# Patient Record
Sex: FEMALE | Race: WHITE | NOT HISPANIC OR LATINO
[De-identification: names, ages, dates, MRNs, and addresses within clinical notes are randomized per-mention and may not be internally consistent; named-entity substitution may affect disease eponyms.]

---

## 2020-02-14 ENCOUNTER — TRANSCRIPTION ENCOUNTER (OUTPATIENT)
Age: 30
End: 2020-02-14

## 2020-07-24 ENCOUNTER — APPOINTMENT (OUTPATIENT)
Dept: ENDOCRINOLOGY | Facility: CLINIC | Age: 30
End: 2020-07-24
Payer: COMMERCIAL

## 2020-07-24 VITALS
HEIGHT: 61 IN | BODY MASS INDEX: 29.45 KG/M2 | OXYGEN SATURATION: 98 % | WEIGHT: 156 LBS | TEMPERATURE: 97.7 F | SYSTOLIC BLOOD PRESSURE: 120 MMHG | DIASTOLIC BLOOD PRESSURE: 68 MMHG | HEART RATE: 83 BPM

## 2020-07-24 DIAGNOSIS — Z78.9 OTHER SPECIFIED HEALTH STATUS: ICD-10-CM

## 2020-07-24 DIAGNOSIS — Z83.49 FAMILY HISTORY OF OTHER ENDOCRINE, NUTRITIONAL AND METABOLIC DISEASES: ICD-10-CM

## 2020-07-24 PROCEDURE — 99204 OFFICE O/P NEW MOD 45 MIN: CPT | Mod: 25

## 2020-07-24 PROCEDURE — 36415 COLL VENOUS BLD VENIPUNCTURE: CPT

## 2020-07-24 NOTE — HISTORY OF PRESENT ILLNESS
[FreeTextEntry1] : Ms. PETERSON  is a 30 year  year old female  who presents for initial endocrine evaluation. She presents with regard to a history of hypothyroidism dx about 5 yrs ago. Dose has incr from 50 to 100 mcg over past few years. Had thyroid US at [facility] several month sago-told normal . Additional medical history includes that of  psoriasis-uses steroid cream as needed.\par Mat Gm withh x of thyroid dysfxn and dad with psoriasis-mild.  Takes her thyroid fasting in am No known Hashimitos hx\par On ocp x 5-6 yrs -menses reg and light.\par Weight up somewhat over pst few yrs -now looking to eat better and exercise\par Works at [employer]\par Occasionally takes a probiotic.\par hair thinner overall over past few yrs-nails ok\par PMD  Dr. Victor Manuel Gutierrez

## 2020-07-24 NOTE — PHYSICAL EXAM
[Well Nourished] : well nourished [Alert] : alert [EOMI] : extra ocular movement intact [Well Developed] : well developed [No Acute Distress] : no acute distress [Normal Sclera/Conjunctiva] : normal sclera/conjunctiva [Normal Oropharynx] : the oropharynx was normal [No Proptosis] : no proptosis [Thyroid Not Enlarged] : the thyroid was not enlarged [No Thyroid Nodules] : no palpable thyroid nodules [No Accessory Muscle Use] : no accessory muscle use [No Respiratory Distress] : no respiratory distress [Normal Rate] : heart rate was normal [Clear to Auscultation] : lungs were clear to auscultation bilaterally [Normal S1, S2] : normal S1 and S2 [Regular Rhythm] : with a regular rhythm [Pedal Pulses Normal] : the pedal pulses are present [No Edema] : no peripheral edema [Not Tender] : non-tender [Not Distended] : not distended [Normal Bowel Sounds] : normal bowel sounds [Soft] : abdomen soft [No Spinal Tenderness] : no spinal tenderness [Normal Anterior Cervical Nodes] : no anterior cervical lymphadenopathy [Normal Posterior Cervical Nodes] : no posterior cervical lymphadenopathy [No Stigmata of Cushings Syndrome] : no stigmata of Cushings Syndrome [Normal Gait] : normal gait [Spine Straight] : spine straight [No Rash] : no rash [Normal Strength/Tone] : muscle strength and tone were normal [Acanthosis Nigricans] : no acanthosis nigricans [Normal Reflexes] : deep tendon reflexes were 2+ and symmetric [No Tremors] : no tremors [Oriented x3] : oriented to person, place, and time

## 2020-08-11 LAB
25(OH)D3 SERPL-MCNC: 29.7 NG/ML
ALBUMIN SERPL ELPH-MCNC: 4.5 G/DL
ALP BLD-CCNC: 86 U/L
ALT SERPL-CCNC: 39 U/L
ANION GAP SERPL CALC-SCNC: 15 MMOL/L
AST SERPL-CCNC: 33 U/L
BASOPHILS # BLD AUTO: 0.03 K/UL
BASOPHILS NFR BLD AUTO: 0.7 %
BILIRUB SERPL-MCNC: 0.4 MG/DL
BUN SERPL-MCNC: 7 MG/DL
CALCIUM SERPL-MCNC: 9.8 MG/DL
CHLORIDE SERPL-SCNC: 102 MMOL/L
CO2 SERPL-SCNC: 24 MMOL/L
CREAT SERPL-MCNC: 0.79 MG/DL
EOSINOPHIL # BLD AUTO: 0.03 K/UL
EOSINOPHIL NFR BLD AUTO: 0.7 %
GLUCOSE SERPL-MCNC: 94 MG/DL
HCT VFR BLD CALC: 41.8 %
HGB BLD-MCNC: 13.6 G/DL
IMM GRANULOCYTES NFR BLD AUTO: 0.2 %
LYMPHOCYTES # BLD AUTO: 1.59 K/UL
LYMPHOCYTES NFR BLD AUTO: 39.2 %
MAN DIFF?: NORMAL
MCHC RBC-ENTMCNC: 29.8 PG
MCHC RBC-ENTMCNC: 32.5 GM/DL
MCV RBC AUTO: 91.5 FL
MONOCYTES # BLD AUTO: 0.31 K/UL
MONOCYTES NFR BLD AUTO: 7.6 %
NEUTROPHILS # BLD AUTO: 2.09 K/UL
NEUTROPHILS NFR BLD AUTO: 51.6 %
PLATELET # BLD AUTO: 180 K/UL
POTASSIUM SERPL-SCNC: 4.9 MMOL/L
PROT SERPL-MCNC: 6.4 G/DL
RBC # BLD: 4.57 M/UL
RBC # FLD: 11.9 %
SARS-COV-2 IGG SERPL IA-ACNC: 0.01 INDEX
SARS-COV-2 IGG SERPL QL IA: NEGATIVE
SODIUM SERPL-SCNC: 141 MMOL/L
T3FREE SERPL-MCNC: 3.41 PG/ML
T4 FREE SERPL-MCNC: 1.8 NG/DL
THYROGLOB AB SERPL-ACNC: 47 IU/ML
THYROPEROXIDASE AB SERPL IA-ACNC: 213 IU/ML
TSH SERPL-ACNC: 4.21 UIU/ML
WBC # FLD AUTO: 4.06 K/UL

## 2020-10-14 ENCOUNTER — RX RENEWAL (OUTPATIENT)
Age: 30
End: 2020-10-14

## 2020-11-09 ENCOUNTER — NON-APPOINTMENT (OUTPATIENT)
Age: 30
End: 2020-11-09

## 2020-12-14 ENCOUNTER — APPOINTMENT (OUTPATIENT)
Dept: ENDOCRINOLOGY | Facility: CLINIC | Age: 30
End: 2020-12-14
Payer: COMMERCIAL

## 2020-12-14 VITALS
TEMPERATURE: 98.6 F | HEART RATE: 86 BPM | SYSTOLIC BLOOD PRESSURE: 120 MMHG | DIASTOLIC BLOOD PRESSURE: 72 MMHG | WEIGHT: 157 LBS | OXYGEN SATURATION: 99 % | HEIGHT: 61 IN | BODY MASS INDEX: 29.64 KG/M2 | RESPIRATION RATE: 16 BRPM

## 2020-12-14 PROCEDURE — 99214 OFFICE O/P EST MOD 30 MIN: CPT | Mod: 25

## 2020-12-14 PROCEDURE — 99072 ADDL SUPL MATRL&STAF TM PHE: CPT

## 2020-12-14 PROCEDURE — 36415 COLL VENOUS BLD VENIPUNCTURE: CPT

## 2020-12-14 NOTE — HISTORY OF PRESENT ILLNESS
[FreeTextEntry1] : Ms. ANTON RONDON   is a 30 year  year old  female who returns today in follow up with regard to a history of hypothyroidism.  She  is currently taking Levothyroxine 112 mcg daily. She  has been compliant in taking the LT4 daily, away from food or any medication that may inhibit absorption. She  has tolerated this medication well. Without any apparent adverse effects.  She denies any temperature intolerance, significant weight changes, or severe fatigue. She  in addition denies any palpitations, tremors, anxiousness, change in bowel habits or significant change in moods. .\par Additional medical history includes vit D deficiency; taking vit D 50,000 iu weekly\par Vit d was started at lst visit and did come up from 29-56 and Tsh did come down from 4.2 to 2.1\par + Underlying Hashimiatos\par \par

## 2020-12-16 LAB
25(OH)D3 SERPL-MCNC: 54.8 NG/ML
T3FREE SERPL-MCNC: 2.73 PG/ML
T4 FREE SERPL-MCNC: 1.7 NG/DL
TSH SERPL-ACNC: 1.53 UIU/ML

## 2020-12-19 LAB
25(OH)D3 SERPL-MCNC: 56.4 NG/ML
T3FREE SERPL-MCNC: 2.76 PG/ML
T4 FREE SERPL-MCNC: 1.8 NG/DL
TSH SERPL-ACNC: 2.1 UIU/ML

## 2021-06-03 ENCOUNTER — APPOINTMENT (OUTPATIENT)
Dept: ENDOCRINOLOGY | Facility: CLINIC | Age: 31
End: 2021-06-03
Payer: COMMERCIAL

## 2021-06-03 VITALS
SYSTOLIC BLOOD PRESSURE: 122 MMHG | WEIGHT: 155 LBS | BODY MASS INDEX: 29.27 KG/M2 | HEIGHT: 61 IN | OXYGEN SATURATION: 98 % | DIASTOLIC BLOOD PRESSURE: 78 MMHG | HEART RATE: 85 BPM | TEMPERATURE: 98.2 F

## 2021-06-03 PROCEDURE — 99214 OFFICE O/P EST MOD 30 MIN: CPT | Mod: 25

## 2021-06-03 PROCEDURE — 36415 COLL VENOUS BLD VENIPUNCTURE: CPT

## 2021-06-03 PROCEDURE — 99072 ADDL SUPL MATRL&STAF TM PHE: CPT

## 2021-06-03 NOTE — PHYSICAL EXAM
[Alert] : alert [Well Nourished] : well nourished [Well Developed] : well developed [No Acute Distress] : no acute distress [Normal Sclera/Conjunctiva] : normal sclera/conjunctiva [EOMI] : extra ocular movement intact [No Proptosis] : no proptosis [Normal Oropharynx] : the oropharynx was normal [Thyroid Not Enlarged] : the thyroid was not enlarged [No Thyroid Nodules] : no palpable thyroid nodules [No Respiratory Distress] : no respiratory distress [No Accessory Muscle Use] : no accessory muscle use [Clear to Auscultation] : lungs were clear to auscultation bilaterally [Normal S1, S2] : normal S1 and S2 [Normal Rate] : heart rate was normal [No Edema] : no peripheral edema [Regular Rhythm] : with a regular rhythm [Pedal Pulses Normal] : the pedal pulses are present [Normal Bowel Sounds] : normal bowel sounds [Not Tender] : non-tender [Not Distended] : not distended [Soft] : abdomen soft [Normal Anterior Cervical Nodes] : no anterior cervical lymphadenopathy [Normal Posterior Cervical Nodes] : no posterior cervical lymphadenopathy [No Spinal Tenderness] : no spinal tenderness [Spine Straight] : spine straight [No Stigmata of Cushings Syndrome] : no stigmata of Cushings Syndrome [Normal Gait] : normal gait [Normal Strength/Tone] : muscle strength and tone were normal [No Rash] : no rash [Normal Reflexes] : deep tendon reflexes were 2+ and symmetric [No Tremors] : no tremors [Oriented x3] : oriented to person, place, and time [Acanthosis Nigricans] : no acanthosis nigricans

## 2021-06-03 NOTE — HISTORY OF PRESENT ILLNESS
[FreeTextEntry1] : Ms. ANTON RONDON is a 31 year old female who returns today in follow up with regard to a history of hypothyroidism. She is currently taking Levothyroxine 112 mcg daily. She has been compliant in taking the LT4 daily, away from food or any medication that may inhibit absorption. She has tolerated this medication well. Without any apparent adverse effects. She denies any temperature intolerance, significant weight changes, or severe fatigue. She in addition denies any palpitations, tremors, anxiousness, change in bowel habits or significant change in moods. She had an incident where she had floaters in her eyes, followed with her PMD who suggested she meet with a neurologist.Has happened in past, but has not happened again after resolving both times. \par Additional medical history includes vit D deficiency; taking vit D 50,000 iu weekly. She is also taking OCP. \par Vit d was started at lst visit and did come up from 29-56 and Tsh did come down from 4.2 to 2.1\par + Underlying Hashimiotos\par Had both doses of covid vaccine

## 2021-06-21 RX ORDER — ERGOCALCIFEROL 1.25 MG/1
1.25 MG CAPSULE, LIQUID FILLED ORAL
Qty: 4 | Refills: 2 | Status: DISCONTINUED | COMMUNITY
Start: 2020-08-11 | End: 2021-06-21

## 2021-06-22 LAB
25(OH)D3 SERPL-MCNC: 77.3 NG/ML
ALBUMIN SERPL ELPH-MCNC: 4.4 G/DL
ALP BLD-CCNC: 69 U/L
ALT SERPL-CCNC: 18 U/L
ANION GAP SERPL CALC-SCNC: 14 MMOL/L
AST SERPL-CCNC: 20 U/L
BASOPHILS # BLD AUTO: 0.08 K/UL
BASOPHILS NFR BLD AUTO: 1 %
BILIRUB SERPL-MCNC: 0.2 MG/DL
BUN SERPL-MCNC: 10 MG/DL
CALCIUM SERPL-MCNC: 9.9 MG/DL
CHLORIDE SERPL-SCNC: 103 MMOL/L
CO2 SERPL-SCNC: 22 MMOL/L
CREAT SERPL-MCNC: 0.73 MG/DL
EOSINOPHIL # BLD AUTO: 0.23 K/UL
EOSINOPHIL NFR BLD AUTO: 3 %
FERRITIN SERPL-MCNC: 59 NG/ML
GLUCOSE SERPL-MCNC: 75 MG/DL
HCT VFR BLD CALC: 46.3 %
HGB BLD-MCNC: 15 G/DL
IMM GRANULOCYTES NFR BLD AUTO: 0.1 %
IRON SERPL-MCNC: 63 UG/DL
LYMPHOCYTES # BLD AUTO: 2.47 K/UL
LYMPHOCYTES NFR BLD AUTO: 32.4 %
MAN DIFF?: NORMAL
MCHC RBC-ENTMCNC: 30.4 PG
MCHC RBC-ENTMCNC: 32.4 GM/DL
MCV RBC AUTO: 93.7 FL
MONOCYTES # BLD AUTO: 0.53 K/UL
MONOCYTES NFR BLD AUTO: 7 %
NEUTROPHILS # BLD AUTO: 4.3 K/UL
NEUTROPHILS NFR BLD AUTO: 56.5 %
PLATELET # BLD AUTO: 303 K/UL
POTASSIUM SERPL-SCNC: 4.4 MMOL/L
PROT SERPL-MCNC: 7.2 G/DL
RBC # BLD: 4.94 M/UL
RBC # FLD: 12.4 %
SODIUM SERPL-SCNC: 138 MMOL/L
T3FREE SERPL-MCNC: 2.82 PG/ML
T4 FREE SERPL-MCNC: 1.7 NG/DL
TSH SERPL-ACNC: 2.49 UIU/ML
VIT B12 SERPL-MCNC: 334 PG/ML
WBC # FLD AUTO: 7.62 K/UL

## 2021-09-22 ENCOUNTER — APPOINTMENT (OUTPATIENT)
Dept: ENDOCRINOLOGY | Facility: CLINIC | Age: 31
End: 2021-09-22
Payer: COMMERCIAL

## 2021-09-22 VITALS
DIASTOLIC BLOOD PRESSURE: 78 MMHG | RESPIRATION RATE: 16 BRPM | BODY MASS INDEX: 29.85 KG/M2 | OXYGEN SATURATION: 98 % | TEMPERATURE: 98.6 F | HEART RATE: 74 BPM | SYSTOLIC BLOOD PRESSURE: 122 MMHG | WEIGHT: 158 LBS

## 2021-09-22 PROCEDURE — 36415 COLL VENOUS BLD VENIPUNCTURE: CPT

## 2021-09-22 PROCEDURE — 99214 OFFICE O/P EST MOD 30 MIN: CPT | Mod: 25

## 2021-09-22 NOTE — HISTORY OF PRESENT ILLNESS
[FreeTextEntry1] : Ms. ANTON RONDON is a 31 year old female who returns today in follow up with regard to a history of hypothyroidism. She is currently taking Levothyroxine 112 mcg daily in the am. She has been compliant in taking the LT4 daily, away from food or any medication that may inhibit absorption. She has tolerated this medication well. Without any apparent adverse effects. She denies any temperature intolerance, significant weight changes, or severe fatigue. She in addition denies any palpitations, tremors, anxiousness, change in bowel habits or significant change in moods. She had an incident where she had floaters in her eyes, followed with her PMD who suggested she meet with a neurologist.Has happened in past, but has not happened again after resolving both times. \par Additional medical history includes vit D deficiency; taking vit D 1,000 daily. She is also taking OCP. \par TSH returned at 2.49 on 06/03/2021.\par Vit d was started at lst visit and did come up from 29-56.\par + Underlying Hashimiotos\par Had both doses of covid vaccine \par

## 2021-10-06 ENCOUNTER — NON-APPOINTMENT (OUTPATIENT)
Age: 31
End: 2021-10-06

## 2021-10-06 LAB
25(OH)D3 SERPL-MCNC: 50.4 NG/ML
T3FREE SERPL-MCNC: 2.51 PG/ML
T4 FREE SERPL-MCNC: 1.7 NG/DL
THYROGLOB AB SERPL-ACNC: 25.5 IU/ML
THYROPEROXIDASE AB SERPL IA-ACNC: 114 IU/ML
TSH SERPL-ACNC: 2.7 UIU/ML
VIT B12 SERPL-MCNC: 321 PG/ML

## 2021-10-18 ENCOUNTER — TRANSCRIPTION ENCOUNTER (OUTPATIENT)
Age: 31
End: 2021-10-18

## 2021-10-25 ENCOUNTER — TRANSCRIPTION ENCOUNTER (OUTPATIENT)
Age: 31
End: 2021-10-25

## 2022-03-07 ENCOUNTER — APPOINTMENT (OUTPATIENT)
Dept: ENDOCRINOLOGY | Facility: CLINIC | Age: 32
End: 2022-03-07
Payer: COMMERCIAL

## 2022-03-07 VITALS
DIASTOLIC BLOOD PRESSURE: 74 MMHG | WEIGHT: 165 LBS | TEMPERATURE: 98.6 F | BODY MASS INDEX: 31.18 KG/M2 | RESPIRATION RATE: 15 BRPM | HEART RATE: 78 BPM | SYSTOLIC BLOOD PRESSURE: 120 MMHG | OXYGEN SATURATION: 99 %

## 2022-03-07 DIAGNOSIS — L40.9 PSORIASIS, UNSPECIFIED: ICD-10-CM

## 2022-03-07 PROCEDURE — 99214 OFFICE O/P EST MOD 30 MIN: CPT | Mod: 25

## 2022-03-07 PROCEDURE — 36415 COLL VENOUS BLD VENIPUNCTURE: CPT

## 2022-03-07 NOTE — HISTORY OF PRESENT ILLNESS
[FreeTextEntry1] : Ms. ANTON RONDON   is a 31 year old  female who returns today in follow up with regard to a history of hypothyroidism.  She  is currently taking Levothyroxine 112 mcg daily. She  has been compliant in taking the LT4 daily, away from food or any medication that may inhibit absorption. She  has tolerated this medication well. Without any apparent adverse effects.  She denies any temperature intolerance, significant weight changes, or severe fatigue. She  in addition denies any palpitations, tremors, anxiousness, change in bowel habits or significant change in moods. \par Patient is looking to conceive. Has been trying to conceive since October. States she was on birth control for about 7 years. Meeting with fertility specialist at the end of the month. GYN [facility] in [place]. \par Additional medical history includes vit D deficiency; taking vit D3 1,000 IU daily, prenatal vitamin that includes D3 1,000 IU, probiotic, and Brazil nut.\par Too with psoriasis- on ketoconazole. \par Vit d was started at lst visit and did come up from 29-56 and Tsh did come down from 4.2 to 2.1\par + Underlying Hashimoto's\par \par Denies covid infection

## 2022-03-08 LAB
25(OH)D3 SERPL-MCNC: 38.4 NG/ML
T3FREE SERPL-MCNC: 2.72 PG/ML
T4 FREE SERPL-MCNC: 1.8 NG/DL
THYROGLOB AB SERPL-ACNC: <20 IU/ML
THYROPEROXIDASE AB SERPL IA-ACNC: 73.5 IU/ML
TSH SERPL-ACNC: 1.92 UIU/ML

## 2022-03-23 ENCOUNTER — APPOINTMENT (OUTPATIENT)
Dept: HUMAN REPRODUCTION | Facility: CLINIC | Age: 32
End: 2022-03-23
Payer: COMMERCIAL

## 2022-03-23 PROCEDURE — 99204 OFFICE O/P NEW MOD 45 MIN: CPT | Mod: 95

## 2022-04-12 ENCOUNTER — APPOINTMENT (OUTPATIENT)
Dept: HUMAN REPRODUCTION | Facility: CLINIC | Age: 32
End: 2022-04-12
Payer: COMMERCIAL

## 2022-04-12 PROCEDURE — 36415 COLL VENOUS BLD VENIPUNCTURE: CPT

## 2022-04-12 PROCEDURE — 99213 OFFICE O/P EST LOW 20 MIN: CPT | Mod: 25

## 2022-04-12 PROCEDURE — 76830 TRANSVAGINAL US NON-OB: CPT

## 2022-04-14 ENCOUNTER — NON-APPOINTMENT (OUTPATIENT)
Age: 32
End: 2022-04-14

## 2022-04-19 ENCOUNTER — APPOINTMENT (OUTPATIENT)
Dept: HUMAN REPRODUCTION | Facility: CLINIC | Age: 32
End: 2022-04-19
Payer: COMMERCIAL

## 2022-04-19 PROCEDURE — 36415 COLL VENOUS BLD VENIPUNCTURE: CPT

## 2022-04-19 PROCEDURE — 99211 OFF/OP EST MAY X REQ PHY/QHP: CPT | Mod: 25

## 2022-04-19 PROCEDURE — 76830 TRANSVAGINAL US NON-OB: CPT

## 2022-04-26 ENCOUNTER — APPOINTMENT (OUTPATIENT)
Dept: HUMAN REPRODUCTION | Facility: CLINIC | Age: 32
End: 2022-04-26
Payer: COMMERCIAL

## 2022-04-26 PROCEDURE — 36415 COLL VENOUS BLD VENIPUNCTURE: CPT

## 2022-04-26 PROCEDURE — 76830 TRANSVAGINAL US NON-OB: CPT

## 2022-04-26 PROCEDURE — 99213 OFFICE O/P EST LOW 20 MIN: CPT | Mod: 25

## 2022-04-29 ENCOUNTER — APPOINTMENT (OUTPATIENT)
Dept: HUMAN REPRODUCTION | Facility: CLINIC | Age: 32
End: 2022-04-29
Payer: COMMERCIAL

## 2022-04-29 PROCEDURE — 99213 OFFICE O/P EST LOW 20 MIN: CPT | Mod: 25

## 2022-04-29 PROCEDURE — 36415 COLL VENOUS BLD VENIPUNCTURE: CPT

## 2022-04-29 PROCEDURE — 76830 TRANSVAGINAL US NON-OB: CPT

## 2022-05-18 ENCOUNTER — APPOINTMENT (OUTPATIENT)
Dept: HUMAN REPRODUCTION | Facility: CLINIC | Age: 32
End: 2022-05-18
Payer: COMMERCIAL

## 2022-05-18 PROCEDURE — 99213 OFFICE O/P EST LOW 20 MIN: CPT | Mod: 25

## 2022-05-18 PROCEDURE — 36415 COLL VENOUS BLD VENIPUNCTURE: CPT

## 2022-05-18 PROCEDURE — 76830 TRANSVAGINAL US NON-OB: CPT

## 2022-05-23 LAB
T3FREE SERPL-MCNC: 3.07 PG/ML
T4 FREE SERPL-MCNC: 2.1 NG/DL
TSH SERPL-ACNC: 0.4 UIU/ML

## 2022-05-26 ENCOUNTER — APPOINTMENT (OUTPATIENT)
Dept: HUMAN REPRODUCTION | Facility: CLINIC | Age: 32
End: 2022-05-26
Payer: COMMERCIAL

## 2022-05-26 PROCEDURE — 99213 OFFICE O/P EST LOW 20 MIN: CPT | Mod: 25

## 2022-05-26 PROCEDURE — 76830 TRANSVAGINAL US NON-OB: CPT

## 2022-06-14 ENCOUNTER — NON-APPOINTMENT (OUTPATIENT)
Age: 32
End: 2022-06-14

## 2022-06-16 ENCOUNTER — APPOINTMENT (OUTPATIENT)
Dept: HUMAN REPRODUCTION | Facility: CLINIC | Age: 32
End: 2022-06-16
Payer: COMMERCIAL

## 2022-06-16 PROCEDURE — 36415 COLL VENOUS BLD VENIPUNCTURE: CPT

## 2022-06-16 PROCEDURE — 99211 OFF/OP EST MAY X REQ PHY/QHP: CPT | Mod: 25

## 2022-06-18 ENCOUNTER — APPOINTMENT (OUTPATIENT)
Dept: HUMAN REPRODUCTION | Facility: CLINIC | Age: 32
End: 2022-06-18
Payer: COMMERCIAL

## 2022-06-18 PROCEDURE — 99211 OFF/OP EST MAY X REQ PHY/QHP: CPT | Mod: 25

## 2022-06-18 PROCEDURE — 36415 COLL VENOUS BLD VENIPUNCTURE: CPT

## 2022-06-21 ENCOUNTER — NON-APPOINTMENT (OUTPATIENT)
Age: 32
End: 2022-06-21

## 2022-06-22 ENCOUNTER — APPOINTMENT (OUTPATIENT)
Dept: HUMAN REPRODUCTION | Facility: CLINIC | Age: 32
End: 2022-06-22
Payer: COMMERCIAL

## 2022-06-22 PROCEDURE — 99213 OFFICE O/P EST LOW 20 MIN: CPT | Mod: 25

## 2022-06-22 PROCEDURE — 76830 TRANSVAGINAL US NON-OB: CPT

## 2022-06-22 PROCEDURE — 36415 COLL VENOUS BLD VENIPUNCTURE: CPT

## 2022-06-30 ENCOUNTER — APPOINTMENT (OUTPATIENT)
Dept: HUMAN REPRODUCTION | Facility: CLINIC | Age: 32
End: 2022-06-30

## 2022-06-30 PROCEDURE — 99213 OFFICE O/P EST LOW 20 MIN: CPT | Mod: 25

## 2022-06-30 PROCEDURE — 76830 TRANSVAGINAL US NON-OB: CPT

## 2022-07-06 ENCOUNTER — APPOINTMENT (OUTPATIENT)
Dept: HUMAN REPRODUCTION | Facility: CLINIC | Age: 32
End: 2022-07-06

## 2022-07-06 PROCEDURE — 76830 TRANSVAGINAL US NON-OB: CPT

## 2022-07-06 PROCEDURE — 36415 COLL VENOUS BLD VENIPUNCTURE: CPT

## 2022-07-06 PROCEDURE — 99213 OFFICE O/P EST LOW 20 MIN: CPT | Mod: 25

## 2022-07-11 ENCOUNTER — APPOINTMENT (OUTPATIENT)
Dept: HUMAN REPRODUCTION | Facility: CLINIC | Age: 32
End: 2022-07-11

## 2022-07-11 PROCEDURE — 99213 OFFICE O/P EST LOW 20 MIN: CPT | Mod: 25

## 2022-07-11 PROCEDURE — 36415 COLL VENOUS BLD VENIPUNCTURE: CPT

## 2022-07-11 PROCEDURE — 76830 TRANSVAGINAL US NON-OB: CPT

## 2022-07-18 LAB
T3FREE SERPL-MCNC: 3.42 PG/ML
T4 FREE SERPL-MCNC: 2.4 NG/DL
TSH SERPL-ACNC: 0.3 UIU/ML

## 2022-07-28 ENCOUNTER — APPOINTMENT (OUTPATIENT)
Dept: HUMAN REPRODUCTION | Facility: CLINIC | Age: 32
End: 2022-07-28

## 2022-07-28 PROCEDURE — 36415 COLL VENOUS BLD VENIPUNCTURE: CPT

## 2022-07-28 PROCEDURE — 76830 TRANSVAGINAL US NON-OB: CPT

## 2022-07-28 PROCEDURE — 99213 OFFICE O/P EST LOW 20 MIN: CPT | Mod: 25

## 2022-08-04 ENCOUNTER — APPOINTMENT (OUTPATIENT)
Dept: HUMAN REPRODUCTION | Facility: CLINIC | Age: 32
End: 2022-08-04

## 2022-08-04 PROCEDURE — 76830 TRANSVAGINAL US NON-OB: CPT

## 2022-08-04 PROCEDURE — 99213 OFFICE O/P EST LOW 20 MIN: CPT | Mod: 25

## 2022-08-08 ENCOUNTER — APPOINTMENT (OUTPATIENT)
Dept: HUMAN REPRODUCTION | Facility: CLINIC | Age: 32
End: 2022-08-08

## 2022-08-16 LAB
T3FREE SERPL-MCNC: 2.72 PG/ML
T4 FREE SERPL-MCNC: 1.6 NG/DL
TSH SERPL-ACNC: 0.51 UIU/ML

## 2022-08-16 RX ORDER — LEVOTHYROXINE SODIUM 0.11 MG/1
112 TABLET ORAL DAILY
Qty: 90 | Refills: 1 | Status: DISCONTINUED | COMMUNITY
Start: 2020-08-11 | End: 2022-08-16

## 2022-08-16 RX ORDER — LEVOTHYROXINE SODIUM 0.14 MG/1
137 TABLET ORAL
Qty: 60 | Refills: 0 | Status: DISCONTINUED | COMMUNITY
Start: 2022-04-18 | End: 2022-08-16

## 2022-08-30 ENCOUNTER — APPOINTMENT (OUTPATIENT)
Dept: HUMAN REPRODUCTION | Facility: CLINIC | Age: 32
End: 2022-08-30

## 2022-08-30 PROCEDURE — 36415 COLL VENOUS BLD VENIPUNCTURE: CPT

## 2022-08-30 PROCEDURE — 99211 OFF/OP EST MAY X REQ PHY/QHP: CPT | Mod: 25

## 2022-09-01 ENCOUNTER — APPOINTMENT (OUTPATIENT)
Dept: HUMAN REPRODUCTION | Facility: CLINIC | Age: 32
End: 2022-09-01

## 2022-09-01 PROCEDURE — 99211 OFF/OP EST MAY X REQ PHY/QHP: CPT | Mod: 25

## 2022-09-01 PROCEDURE — 36415 COLL VENOUS BLD VENIPUNCTURE: CPT

## 2022-09-08 ENCOUNTER — APPOINTMENT (OUTPATIENT)
Dept: HUMAN REPRODUCTION | Facility: CLINIC | Age: 32
End: 2022-09-08

## 2022-09-08 PROCEDURE — 76817 TRANSVAGINAL US OBSTETRIC: CPT

## 2022-09-08 PROCEDURE — 99213 OFFICE O/P EST LOW 20 MIN: CPT | Mod: 25

## 2022-09-19 ENCOUNTER — APPOINTMENT (OUTPATIENT)
Dept: HUMAN REPRODUCTION | Facility: CLINIC | Age: 32
End: 2022-09-19

## 2022-09-19 PROCEDURE — 99213 OFFICE O/P EST LOW 20 MIN: CPT | Mod: 25

## 2022-09-19 PROCEDURE — 76817 TRANSVAGINAL US OBSTETRIC: CPT

## 2022-09-29 ENCOUNTER — RESULT REVIEW (OUTPATIENT)
Age: 32
End: 2022-09-29

## 2022-09-30 LAB
25(OH)D3 SERPL-MCNC: 64.3 NG/ML
T3FREE SERPL-MCNC: 2.21 PG/ML
T4 FREE SERPL-MCNC: 1.6 NG/DL
TSH SERPL-ACNC: 0.74 UIU/ML

## 2022-10-31 ENCOUNTER — APPOINTMENT (OUTPATIENT)
Dept: ENDOCRINOLOGY | Facility: CLINIC | Age: 32
End: 2022-10-31

## 2022-10-31 VITALS
BODY MASS INDEX: 32.1 KG/M2 | WEIGHT: 170 LBS | SYSTOLIC BLOOD PRESSURE: 118 MMHG | HEIGHT: 61 IN | HEART RATE: 100 BPM | TEMPERATURE: 98.4 F | OXYGEN SATURATION: 99 % | DIASTOLIC BLOOD PRESSURE: 78 MMHG

## 2022-10-31 PROCEDURE — 36415 COLL VENOUS BLD VENIPUNCTURE: CPT

## 2022-10-31 PROCEDURE — 99214 OFFICE O/P EST MOD 30 MIN: CPT | Mod: 25

## 2022-11-02 ENCOUNTER — NON-APPOINTMENT (OUTPATIENT)
Age: 32
End: 2022-11-02

## 2022-11-02 LAB
25(OH)D3 SERPL-MCNC: 58.1 NG/ML
FOLATE SERPL-MCNC: >20 NG/ML
T3FREE SERPL-MCNC: 2.88 PG/ML
T4 FREE SERPL-MCNC: 1.5 NG/DL
THYROGLOB AB SERPL-ACNC: <20 IU/ML
THYROPEROXIDASE AB SERPL IA-ACNC: 64.6 IU/ML
TSH SERPL-ACNC: 2.64 UIU/ML
VIT B12 SERPL-MCNC: 464 PG/ML

## 2022-11-03 NOTE — HISTORY OF PRESENT ILLNESS
[FreeTextEntry1] : Ms. ANTON RONDON is a 32 year old female who returns today in follow up with regard to a history of hypothyroidism. She is currently taking Levothyroxine 137 mcg 5 days of the week and 125 mcg two days of the week. She is currently 13 weeks and 4 days pregnant, due date 5/4/22 with sex of baby as a surprise.  Patients nausea and appetite has improved significantly in second trimester. Energy levels also improving.  \par \par She has been compliant in taking the LT4 daily, away from food or any medication that may inhibit absorption. She has tolerated this medication well. Without any apparent adverse effects. She denies any temperature intolerance, significant weight changes, or severe fatigue. She in addition denies any palpitations, tremors, anxiousness, change in bowel habits or significant change in moods. 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 . \par + Underlying Hashimoto's\par \par OBGYN: Dr. Warren\par Denies covid infection \par

## 2022-12-02 LAB
T3FREE SERPL-MCNC: 2.79 PG/ML
T4 FREE SERPL-MCNC: 1.4 NG/DL
TSH SERPL-ACNC: 1.73 UIU/ML

## 2022-12-16 ENCOUNTER — ASOB RESULT (OUTPATIENT)
Age: 32
End: 2022-12-16

## 2022-12-16 ENCOUNTER — APPOINTMENT (OUTPATIENT)
Dept: ANTEPARTUM | Facility: CLINIC | Age: 32
End: 2022-12-16

## 2022-12-16 PROCEDURE — 76811 OB US DETAILED SNGL FETUS: CPT

## 2022-12-30 LAB
T3FREE SERPL-MCNC: 2.66 PG/ML
T4 FREE SERPL-MCNC: 1.2 NG/DL
TSH SERPL-ACNC: 0.56 UIU/ML

## 2023-02-06 ENCOUNTER — APPOINTMENT (OUTPATIENT)
Dept: ENDOCRINOLOGY | Facility: CLINIC | Age: 33
End: 2023-02-06
Payer: COMMERCIAL

## 2023-02-06 VITALS
HEIGHT: 61 IN | OXYGEN SATURATION: 98 % | TEMPERATURE: 98.2 F | WEIGHT: 184 LBS | DIASTOLIC BLOOD PRESSURE: 80 MMHG | HEART RATE: 99 BPM | SYSTOLIC BLOOD PRESSURE: 120 MMHG | BODY MASS INDEX: 34.74 KG/M2

## 2023-02-06 DIAGNOSIS — Z00.00 ENCOUNTER FOR GENERAL ADULT MEDICAL EXAMINATION W/OUT ABNORMAL FINDINGS: ICD-10-CM

## 2023-02-06 PROCEDURE — 99214 OFFICE O/P EST MOD 30 MIN: CPT | Mod: 25

## 2023-02-06 PROCEDURE — 36415 COLL VENOUS BLD VENIPUNCTURE: CPT

## 2023-02-06 RX ORDER — LEVOTHYROXINE SODIUM 0.12 MG/1
125 TABLET ORAL
Qty: 10 | Refills: 0 | Status: DISCONTINUED | COMMUNITY
Start: 2022-05-23 | End: 2023-02-06

## 2023-02-07 LAB
T3FREE SERPL-MCNC: 2.65 PG/ML
T4 FREE SERPL-MCNC: 1.3 NG/DL
THYROGLOB AB SERPL-ACNC: <20 IU/ML
THYROPEROXIDASE AB SERPL IA-ACNC: 19.7 IU/ML
TSH SERPL-ACNC: 0.8 UIU/ML

## 2023-02-11 NOTE — HISTORY OF PRESENT ILLNESS
[FreeTextEntry1] : Ms. ANTON RONDON is a 32 year old female who returns today in follow up with regard to a history of hypothyroidism. She is currently taking Levothyroxine 137 mcg daily. her due date 5/4/22.\par Patients nausea and appetite has improved significantly in second trimester. Energy levels also improving. \par \par She is currently 27 weeks gestation.\par \par She has been compliant in taking the LT4 daily, away from food or any medication that may inhibit absorption. She has tolerated this medication well. Without any apparent adverse effects. She denies any temperature intolerance, significant weight changes, or severe fatigue. She in addition denies any palpitations, tremors, anxiousness, change in bowel habits or significant change in moods. 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 . \par + Underlying Hashimoto's\par \par OBGYN: Dr. Warren\par Denies covid infection

## 2023-03-17 LAB
T3FREE SERPL-MCNC: 2.63 PG/ML
T4 FREE SERPL-MCNC: 1.4 NG/DL
TSH SERPL-ACNC: 0.64 UIU/ML

## 2023-03-24 ENCOUNTER — ASOB RESULT (OUTPATIENT)
Age: 33
End: 2023-03-24

## 2023-03-24 ENCOUNTER — APPOINTMENT (OUTPATIENT)
Dept: MATERNAL FETAL MEDICINE | Facility: CLINIC | Age: 33
End: 2023-03-24
Payer: COMMERCIAL

## 2023-03-24 PROCEDURE — G0109 DIAB MANAGE TRN IND/GROUP: CPT | Mod: 95

## 2023-03-24 RX ORDER — CHOLECALCIFEROL (VITAMIN D3) 10(400)/ML
DROPS ORAL
Qty: 2 | Refills: 0 | Status: ACTIVE | COMMUNITY
Start: 2023-03-24 | End: 1900-01-01

## 2023-03-24 RX ORDER — BLOOD-GLUCOSE METER
W/DEVICE KIT MISCELLANEOUS
Qty: 1 | Refills: 0 | Status: ACTIVE | COMMUNITY
Start: 2023-03-24 | End: 1900-01-01

## 2023-03-24 RX ORDER — BLOOD-GLUCOSE METER
KIT MISCELLANEOUS
Qty: 2 | Refills: 1 | Status: ACTIVE | COMMUNITY
Start: 2023-03-24 | End: 1900-01-01

## 2023-03-24 RX ORDER — URINE ACETONE TEST STRIPS
STRIP MISCELLANEOUS
Qty: 1 | Refills: 0 | Status: ACTIVE | COMMUNITY
Start: 2023-03-24 | End: 1900-01-01

## 2023-04-04 ENCOUNTER — ASOB RESULT (OUTPATIENT)
Age: 33
End: 2023-04-04

## 2023-04-04 ENCOUNTER — APPOINTMENT (OUTPATIENT)
Dept: MATERNAL FETAL MEDICINE | Facility: CLINIC | Age: 33
End: 2023-04-04
Payer: COMMERCIAL

## 2023-04-04 PROCEDURE — G0108 DIAB MANAGE TRN  PER INDIV: CPT | Mod: 95

## 2023-04-14 ENCOUNTER — ASOB RESULT (OUTPATIENT)
Age: 33
End: 2023-04-14

## 2023-04-14 ENCOUNTER — APPOINTMENT (OUTPATIENT)
Dept: MATERNAL FETAL MEDICINE | Facility: CLINIC | Age: 33
End: 2023-04-14
Payer: COMMERCIAL

## 2023-04-14 PROCEDURE — G0108 DIAB MANAGE TRN  PER INDIV: CPT | Mod: 95

## 2023-04-17 LAB
T3FREE SERPL-MCNC: 2.68 PG/ML
T4 FREE SERPL-MCNC: 1.1 NG/DL
TSH SERPL-ACNC: 1.55 UIU/ML

## 2023-05-03 ENCOUNTER — INPATIENT (INPATIENT)
Facility: HOSPITAL | Age: 33
LOS: 3 days | Discharge: HOME | End: 2023-05-07
Attending: OBSTETRICS & GYNECOLOGY | Admitting: OBSTETRICS & GYNECOLOGY
Payer: COMMERCIAL

## 2023-05-03 VITALS — HEART RATE: 104 BPM | OXYGEN SATURATION: 100 %

## 2023-05-03 DIAGNOSIS — O24.414 GESTATIONAL DIABETES MELLITUS IN PREGNANCY, INSULIN CONTROLLED: ICD-10-CM

## 2023-05-03 DIAGNOSIS — O40.3XX1 POLYHYDRAMNIOS, THIRD TRIMESTER, FETUS 1: ICD-10-CM

## 2023-05-03 LAB
BASOPHILS # BLD AUTO: 0.04 K/UL — SIGNIFICANT CHANGE UP (ref 0–0.2)
BASOPHILS NFR BLD AUTO: 0.4 % — SIGNIFICANT CHANGE UP (ref 0–2)
EOSINOPHIL # BLD AUTO: 0.1 K/UL — SIGNIFICANT CHANGE UP (ref 0–0.5)
EOSINOPHIL NFR BLD AUTO: 1 % — SIGNIFICANT CHANGE UP (ref 0–6)
GLUCOSE BLDC GLUCOMTR-MCNC: 91 MG/DL — SIGNIFICANT CHANGE UP (ref 70–99)
HCT VFR BLD CALC: 40.5 % — SIGNIFICANT CHANGE UP (ref 34.5–45)
HGB BLD-MCNC: 13.8 G/DL — SIGNIFICANT CHANGE UP (ref 11.5–15.5)
IMM GRANULOCYTES NFR BLD AUTO: 0.4 % — SIGNIFICANT CHANGE UP (ref 0–0.9)
LYMPHOCYTES # BLD AUTO: 1.52 K/UL — SIGNIFICANT CHANGE UP (ref 1–3.3)
LYMPHOCYTES # BLD AUTO: 15.6 % — SIGNIFICANT CHANGE UP (ref 13–44)
MCHC RBC-ENTMCNC: 30.5 PG — SIGNIFICANT CHANGE UP (ref 27–34)
MCHC RBC-ENTMCNC: 34.1 GM/DL — SIGNIFICANT CHANGE UP (ref 32–36)
MCV RBC AUTO: 89.4 FL — SIGNIFICANT CHANGE UP (ref 80–100)
MONOCYTES # BLD AUTO: 0.81 K/UL — SIGNIFICANT CHANGE UP (ref 0–0.9)
MONOCYTES NFR BLD AUTO: 8.3 % — SIGNIFICANT CHANGE UP (ref 2–14)
NEUTROPHILS # BLD AUTO: 7.26 K/UL — SIGNIFICANT CHANGE UP (ref 1.8–7.4)
NEUTROPHILS NFR BLD AUTO: 74.3 % — SIGNIFICANT CHANGE UP (ref 43–77)
NRBC # BLD: 0 /100 WBCS — SIGNIFICANT CHANGE UP (ref 0–0)
PLATELET # BLD AUTO: 212 K/UL — SIGNIFICANT CHANGE UP (ref 150–400)
RBC # BLD: 4.53 M/UL — SIGNIFICANT CHANGE UP (ref 3.8–5.2)
RBC # FLD: 13.1 % — SIGNIFICANT CHANGE UP (ref 10.3–14.5)
WBC # BLD: 9.77 K/UL — SIGNIFICANT CHANGE UP (ref 3.8–10.5)
WBC # FLD AUTO: 9.77 K/UL — SIGNIFICANT CHANGE UP (ref 3.8–10.5)

## 2023-05-03 RX ORDER — OXYTOCIN 10 UNIT/ML
4 VIAL (ML) INJECTION
Qty: 30 | Refills: 0 | Status: DISCONTINUED | OUTPATIENT
Start: 2023-05-04 | End: 2023-05-04

## 2023-05-03 RX ORDER — CITRIC ACID/SODIUM CITRATE 300-500 MG
15 SOLUTION, ORAL ORAL EVERY 6 HOURS
Refills: 0 | Status: DISCONTINUED | OUTPATIENT
Start: 2023-05-03 | End: 2023-05-05

## 2023-05-03 RX ORDER — SODIUM CHLORIDE 9 MG/ML
1000 INJECTION INTRAMUSCULAR; INTRAVENOUS; SUBCUTANEOUS
Refills: 0 | Status: DISCONTINUED | OUTPATIENT
Start: 2023-05-03 | End: 2023-05-05

## 2023-05-03 RX ORDER — OXYTOCIN 10 UNIT/ML
333.33 VIAL (ML) INJECTION
Qty: 20 | Refills: 0 | Status: DISCONTINUED | OUTPATIENT
Start: 2023-05-03 | End: 2023-05-07

## 2023-05-03 RX ORDER — CHLORHEXIDINE GLUCONATE 213 G/1000ML
1 SOLUTION TOPICAL ONCE
Refills: 0 | Status: COMPLETED | OUTPATIENT
Start: 2023-05-03 | End: 2023-05-03

## 2023-05-03 RX ORDER — SODIUM CHLORIDE 9 MG/ML
1000 INJECTION, SOLUTION INTRAVENOUS
Refills: 0 | Status: DISCONTINUED | OUTPATIENT
Start: 2023-05-03 | End: 2023-05-05

## 2023-05-03 RX ADMIN — CHLORHEXIDINE GLUCONATE 1 APPLICATION(S): 213 SOLUTION TOPICAL at 20:25

## 2023-05-03 RX ADMIN — SODIUM CHLORIDE 125 MILLILITER(S): 9 INJECTION INTRAMUSCULAR; INTRAVENOUS; SUBCUTANEOUS at 19:57

## 2023-05-03 RX ADMIN — SODIUM CHLORIDE 125 MILLILITER(S): 9 INJECTION, SOLUTION INTRAVENOUS at 19:57

## 2023-05-03 NOTE — OB PROVIDER H&P - HISTORY OF PRESENT ILLNESS
31yo  @ 39w6d (ED 23) presenting for scheduled IOL 2/2 gDMA1. Reports josé luis gonzalez contractions; denies LOF, VB. +FM.    PNC: [facility]. gDMA1, resolved polyhydramnios. no reported genetic/sono abnormalities. EFW 3600. GBS neg.    OBHx:  G1  chemical pregnancy  G2 current    GYNHx: denies fibroids/ov cysts/STIs/abnormal Pap smears    PMH: hypothyroidism, psoriasis, heart murmur (last Cards 8yr ago in [place])  PSH: denies    Meds: PNV, Synthroid 137mcg, probiotic, Vit D, ASA81  All: NKDA    Soc: denies T/E/D  Psych: denies    Will accept blood products.

## 2023-05-03 NOTE — OB PROVIDER H&P - NSHPPHYSICALEXAM_GEN_ALL_CORE
T(C): 36.7 (05-03-23 @ 18:44), Max: 36.7 (05-03-23 @ 18:43)  HR: 99 (05-03-23 @ 19:22) (93 - 114)  BP: 128/77 (05-03-23 @ 18:44) (128/77 - 128/77)  RR: 18 (05-03-23 @ 18:44) (18 - 18)  SpO2: 95% (05-03-23 @ 19:22) (94% - 100%)    /mod/+accel/-decel  [place] q5min  VE 0/0/-3  BSS VTX, MVP 6.6    gen: awake, alert, nad  chest: nonlabored breathing  abd: soft, gravid, nontender  gu: nefg  le: nontender, nonerythematous

## 2023-05-03 NOTE — OB PROVIDER H&P - ATTENDING COMMENTS
Patient presents for scheduled induction of labor at 39+wks. Patient without acute complaints.    history significant for GDMA, diet controlled with mild polyhydramnios - resolved.    NKDA   Vitals reviewed   Resident exam reviewed   EFW 3900g cephalic presentation   EFM: reactive   [place]: irregular contractions   GBS negative   A/P: IUP at 39+wks with GDMA, diet controlled   -induction of labor with buccal cytotec and cervical balloon  -pitocin augmentation   -pain control PRN   -monitor FS with rotating fluids   -continue EFM and toco   -IV fluids and labs   -venodynes to DORIS Umana

## 2023-05-03 NOTE — OB PROVIDER H&P - ASSESSMENT
31yo  @ 39w6d (JUANCHO 23) admitted for scheduled IOL 2/2 gDMA1.     #IOL  - Admit to L&D. Routine labs.   - IOL with BC, CB, pit@2a  - GBS neg  - Fetus cat 1  - epiPRN    #A1  - NPO  - rotating fluids, NS for FS >100; D5-NS for FS <100  - FS q4h    D/w Dr. Dong Douglas R4

## 2023-05-03 NOTE — OB PROVIDER H&P - NSLOWPPHRISK_OBGYN_A_OB
No previous uterine incision/[place] Pregnancy/Less than or equal to 4 previous vaginal births/No known bleeding disorder/No history of postpartum hemorrhage/No other PPH risks indicated

## 2023-05-03 NOTE — OB RN PATIENT PROFILE - FUNCTIONAL SCREEN CURRENT LEVEL: COMMUNICATION, MLM
Spoke with pt. Ok to take one ibu 200 with the tylenol. Call if not improving.    0 = understands/communicates without difficulty

## 2023-05-04 LAB
COVID-19 SPIKE DOMAIN AB INTERP: POSITIVE
COVID-19 SPIKE DOMAIN ANTIBODY RESULT: >250 U/ML — HIGH
GLUCOSE BLDC GLUCOMTR-MCNC: 100 MG/DL — HIGH (ref 70–99)
GLUCOSE BLDC GLUCOMTR-MCNC: 101 MG/DL — HIGH (ref 70–99)
GLUCOSE BLDC GLUCOMTR-MCNC: 123 MG/DL — HIGH (ref 70–99)
GLUCOSE BLDC GLUCOMTR-MCNC: 81 MG/DL — SIGNIFICANT CHANGE UP (ref 70–99)
GLUCOSE BLDC GLUCOMTR-MCNC: 86 MG/DL — SIGNIFICANT CHANGE UP (ref 70–99)
GLUCOSE BLDC GLUCOMTR-MCNC: 87 MG/DL — SIGNIFICANT CHANGE UP (ref 70–99)
GLUCOSE BLDC GLUCOMTR-MCNC: 88 MG/DL — SIGNIFICANT CHANGE UP (ref 70–99)
SARS-COV-2 IGG+IGM SERPL QL IA: >250 U/ML — HIGH
SARS-COV-2 IGG+IGM SERPL QL IA: POSITIVE
T PALLIDUM AB TITR SER: NEGATIVE — SIGNIFICANT CHANGE UP

## 2023-05-04 RX ORDER — OXYTOCIN 10 UNIT/ML
4 VIAL (ML) INJECTION
Qty: 30 | Refills: 0 | Status: DISCONTINUED | OUTPATIENT
Start: 2023-05-04 | End: 2023-05-05

## 2023-05-04 RX ORDER — OXYTOCIN 10 UNIT/ML
2 VIAL (ML) INJECTION
Qty: 30 | Refills: 0 | Status: DISCONTINUED | OUTPATIENT
Start: 2023-05-04 | End: 2023-05-04

## 2023-05-04 RX ORDER — DIPHENHYDRAMINE HCL 50 MG
25 CAPSULE ORAL ONCE
Refills: 0 | Status: COMPLETED | OUTPATIENT
Start: 2023-05-04 | End: 2023-05-04

## 2023-05-04 RX ORDER — LEVOTHYROXINE SODIUM 125 MCG
137 TABLET ORAL DAILY
Refills: 0 | Status: DISCONTINUED | OUTPATIENT
Start: 2023-05-04 | End: 2023-05-05

## 2023-05-04 RX ADMIN — Medication 4 MILLIUNIT(S)/MIN: at 02:35

## 2023-05-04 RX ADMIN — Medication 4 MILLIUNIT(S)/MIN: at 21:00

## 2023-05-04 RX ADMIN — Medication 25 MILLIGRAM(S): at 22:42

## 2023-05-04 RX ADMIN — Medication 137 MICROGRAM(S): at 08:05

## 2023-05-05 ENCOUNTER — TRANSCRIPTION ENCOUNTER (OUTPATIENT)
Age: 33
End: 2023-05-05

## 2023-05-05 LAB
GLUCOSE BLDC GLUCOMTR-MCNC: 108 MG/DL — HIGH (ref 70–99)
GLUCOSE BLDC GLUCOMTR-MCNC: 110 MG/DL — HIGH (ref 70–99)
GLUCOSE BLDC GLUCOMTR-MCNC: 96 MG/DL — SIGNIFICANT CHANGE UP (ref 70–99)

## 2023-05-05 PROCEDURE — 88307 TISSUE EXAM BY PATHOLOGIST: CPT | Mod: 26

## 2023-05-05 RX ORDER — AER TRAVELER 0.5 G/1
1 SOLUTION RECTAL; TOPICAL EVERY 4 HOURS
Refills: 0 | Status: DISCONTINUED | OUTPATIENT
Start: 2023-05-05 | End: 2023-05-07

## 2023-05-05 RX ORDER — OXYTOCIN 10 UNIT/ML
10 VIAL (ML) INJECTION ONCE
Refills: 0 | Status: COMPLETED | OUTPATIENT
Start: 2023-05-05 | End: 2023-05-05

## 2023-05-05 RX ORDER — DIPHENOXYLATE HCL/ATROPINE 2.5-.025MG
2 TABLET ORAL ONCE
Refills: 0 | Status: DISCONTINUED | OUTPATIENT
Start: 2023-05-05 | End: 2023-05-05

## 2023-05-05 RX ORDER — OXYCODONE HYDROCHLORIDE 5 MG/1
5 TABLET ORAL ONCE
Refills: 0 | Status: DISCONTINUED | OUTPATIENT
Start: 2023-05-05 | End: 2023-05-07

## 2023-05-05 RX ORDER — IBUPROFEN 200 MG
600 TABLET ORAL EVERY 6 HOURS
Refills: 0 | Status: COMPLETED | OUTPATIENT
Start: 2023-05-05 | End: 2024-04-02

## 2023-05-05 RX ORDER — DIPHENHYDRAMINE HCL 50 MG
25 CAPSULE ORAL EVERY 6 HOURS
Refills: 0 | Status: DISCONTINUED | OUTPATIENT
Start: 2023-05-05 | End: 2023-05-07

## 2023-05-05 RX ORDER — KETOROLAC TROMETHAMINE 30 MG/ML
30 SYRINGE (ML) INJECTION ONCE
Refills: 0 | Status: DISCONTINUED | OUTPATIENT
Start: 2023-05-05 | End: 2023-05-05

## 2023-05-05 RX ORDER — LEVOTHYROXINE SODIUM 125 MCG
112 TABLET ORAL DAILY
Refills: 0 | Status: DISCONTINUED | OUTPATIENT
Start: 2023-05-05 | End: 2023-05-07

## 2023-05-05 RX ORDER — OXYTOCIN 10 UNIT/ML
41.67 VIAL (ML) INJECTION
Qty: 20 | Refills: 0 | Status: DISCONTINUED | OUTPATIENT
Start: 2023-05-05 | End: 2023-05-07

## 2023-05-05 RX ORDER — OXYCODONE HYDROCHLORIDE 5 MG/1
5 TABLET ORAL
Refills: 0 | Status: DISCONTINUED | OUTPATIENT
Start: 2023-05-05 | End: 2023-05-07

## 2023-05-05 RX ORDER — ACETAMINOPHEN 500 MG
975 TABLET ORAL
Refills: 0 | Status: DISCONTINUED | OUTPATIENT
Start: 2023-05-05 | End: 2023-05-07

## 2023-05-05 RX ORDER — DIBUCAINE 1 %
1 OINTMENT (GRAM) RECTAL EVERY 6 HOURS
Refills: 0 | Status: DISCONTINUED | OUTPATIENT
Start: 2023-05-05 | End: 2023-05-07

## 2023-05-05 RX ORDER — SODIUM CHLORIDE 9 MG/ML
3 INJECTION INTRAMUSCULAR; INTRAVENOUS; SUBCUTANEOUS EVERY 8 HOURS
Refills: 0 | Status: DISCONTINUED | OUTPATIENT
Start: 2023-05-05 | End: 2023-05-07

## 2023-05-05 RX ORDER — TETANUS TOXOID, REDUCED DIPHTHERIA TOXOID AND ACELLULAR PERTUSSIS VACCINE, ADSORBED 5; 2.5; 8; 8; 2.5 [IU]/.5ML; [IU]/.5ML; UG/.5ML; UG/.5ML; UG/.5ML
0.5 SUSPENSION INTRAMUSCULAR ONCE
Refills: 0 | Status: DISCONTINUED | OUTPATIENT
Start: 2023-05-05 | End: 2023-05-07

## 2023-05-05 RX ORDER — IBUPROFEN 200 MG
600 TABLET ORAL EVERY 6 HOURS
Refills: 0 | Status: DISCONTINUED | OUTPATIENT
Start: 2023-05-05 | End: 2023-05-07

## 2023-05-05 RX ORDER — PRAMOXINE HYDROCHLORIDE 150 MG/15G
1 AEROSOL, FOAM RECTAL EVERY 4 HOURS
Refills: 0 | Status: DISCONTINUED | OUTPATIENT
Start: 2023-05-05 | End: 2023-05-07

## 2023-05-05 RX ORDER — SIMETHICONE 80 MG/1
80 TABLET, CHEWABLE ORAL EVERY 4 HOURS
Refills: 0 | Status: DISCONTINUED | OUTPATIENT
Start: 2023-05-05 | End: 2023-05-07

## 2023-05-05 RX ORDER — LANOLIN
1 OINTMENT (GRAM) TOPICAL EVERY 6 HOURS
Refills: 0 | Status: DISCONTINUED | OUTPATIENT
Start: 2023-05-05 | End: 2023-05-07

## 2023-05-05 RX ORDER — HYDROCORTISONE 1 %
1 OINTMENT (GRAM) TOPICAL EVERY 6 HOURS
Refills: 0 | Status: DISCONTINUED | OUTPATIENT
Start: 2023-05-05 | End: 2023-05-07

## 2023-05-05 RX ORDER — MAGNESIUM HYDROXIDE 400 MG/1
30 TABLET, CHEWABLE ORAL
Refills: 0 | Status: DISCONTINUED | OUTPATIENT
Start: 2023-05-05 | End: 2023-05-07

## 2023-05-05 RX ORDER — BENZOCAINE 10 %
1 GEL (GRAM) MUCOUS MEMBRANE EVERY 6 HOURS
Refills: 0 | Status: DISCONTINUED | OUTPATIENT
Start: 2023-05-05 | End: 2023-05-07

## 2023-05-05 RX ORDER — CARBOPROST TROMETHAMINE 250 UG/ML
250 INJECTION, SOLUTION INTRAMUSCULAR ONCE
Refills: 0 | Status: COMPLETED | OUTPATIENT
Start: 2023-05-05 | End: 2023-05-05

## 2023-05-05 RX ADMIN — Medication 2 TABLET(S): at 06:06

## 2023-05-05 RX ADMIN — Medication 1 TABLET(S): at 11:48

## 2023-05-05 RX ADMIN — Medication 600 MILLIGRAM(S): at 17:24

## 2023-05-05 RX ADMIN — Medication 30 MILLIGRAM(S): at 09:30

## 2023-05-05 RX ADMIN — Medication 30 MILLIGRAM(S): at 06:46

## 2023-05-05 RX ADMIN — CARBOPROST TROMETHAMINE 250 MICROGRAM(S): 250 INJECTION, SOLUTION INTRAMUSCULAR at 06:05

## 2023-05-05 RX ADMIN — Medication 975 MILLIGRAM(S): at 15:20

## 2023-05-05 RX ADMIN — Medication 600 MILLIGRAM(S): at 11:48

## 2023-05-05 RX ADMIN — Medication 600 MILLIGRAM(S): at 18:14

## 2023-05-05 RX ADMIN — Medication 112 MICROGRAM(S): at 09:32

## 2023-05-05 RX ADMIN — Medication 10 UNIT(S): at 05:44

## 2023-05-05 RX ADMIN — Medication 975 MILLIGRAM(S): at 14:39

## 2023-05-05 RX ADMIN — Medication 975 MILLIGRAM(S): at 21:10

## 2023-05-05 RX ADMIN — Medication 975 MILLIGRAM(S): at 20:39

## 2023-05-05 RX ADMIN — Medication 600 MILLIGRAM(S): at 12:45

## 2023-05-05 NOTE — OB PROVIDER LABOR PROGRESS NOTE - NS_SUBJECTIVE/OBJECTIVE_OBGYN_ALL_OB_FT
Pt with pressure s/p topoff.    Vital Signs Last 24 Hrs  T(C): 36.4 (05 May 2023 01:02), Max: 37.4 (04 May 2023 18:49)  T(F): 97.52 (05 May 2023 01:02), Max: 99.32 (04 May 2023 18:49)  HR: 130 (05 May 2023 02:36) (66 - 142)  BP: 134/65 (05 May 2023 02:17) (103/56 - 179/101)  RR: 18 (04 May 2023 06:35) (18 - 18)  SpO2: 96% (05 May 2023 02:36) (79% - 100%)    SVE FD/-2
Patient reports increased back pain. Desires top off. Pitocin currently at 20mu/min
Pt checked for constant rectal pressure that feels different from when I previously checked her.
some cramping
Pt checked for labor progress
crampy
Pt checked for insertion of CB
comfortable

## 2023-05-05 NOTE — OB PROVIDER LABOR PROGRESS NOTE - NS_OBIHIFHRDETAILS_OBGYN_ALL_OB_FT
baseline 140, mod moisés, -accel, -decel
baseline 140, mod moisés, +accel, -decel
baseline 140, mod variability, +accels, variable decels
baseline 125, mod moisés, +accel, -decel
mod variability;  no decels
mod variability
mod variability
Cat II due to fetal tachycardia - currently afebrile

## 2023-05-05 NOTE — DISCHARGE NOTE OB - PATIENT PORTAL LINK FT
You can access the FollowMyHealth Patient Portal offered by [facility] by registering at the following website: [email or web address]. By joining [employer]’s FollowMyHealth portal, you will also be able to view your health information using other applications (apps) compatible with our system.

## 2023-05-05 NOTE — DISCHARGE NOTE OB - HOSPITAL COURSE
Patient admitted for induction of labor at 39wks. Patient s/p cervical balloon, buccal cytotec and pitocin for augmentation. Patient underwent VAVD due to nonreassuring FHT and maternal exhaustion. 3b perineal laceration reapproximated without difficulty. Rectal exam was negative for suture. Uterine tone improved after uterotonics at delivery. Postpartum course uncomplicated and patient d/c home on PPD #2 with return precautions.   Patient to have repeat OGT at PP visit in 6wks

## 2023-05-05 NOTE — OB RN DELIVERY SUMMARY - NS_SEPSISRSKCALC_OBGYN_ALL_OB_FT
EOS calculated successfully. EOS Risk Factor: 0.5/1000 live births ([facility] national incidence); GA=40w1d; Temp=99.32; ROM=16.2; GBS='Negative'; Antibiotics='No antibiotics or any antibiotics < 2 hrs prior to birth'

## 2023-05-05 NOTE — DISCHARGE NOTE OB - REASON FOR ADMISSION
Comment: I suspect this is hormonal acne.  She started progesterone only birth control x 6 months due to  side effects from oral  contraceptives.  I suggested she start oracea.  to consider spironolactone in the future, however she is hesitant regarding tating oral meds.
Detail Level: Detailed
IOL at 39wks

## 2023-05-05 NOTE — OB RN DELIVERY SUMMARY - NSSELHIDDEN_OBGYN_ALL_OB_FT
[[email or web address]_OB_FT:[email or web address]=],[NS_DeliveryAssist1_OBGYN_ALL_OB_FT:[email or web address]=],[NS_DeliveryRN_OBGYN_ALL_OB_FT:[id number]=]

## 2023-05-05 NOTE — DISCHARGE NOTE OB - MATERIALS PROVIDED
Vaccinations/[place]  Screening Program/Breastfeeding Log/Breastfeeding Mother’s Support Group Information/Guide to Postpartum Care/Back To Sleep Handout/Shaken Baby Prevention Handout/Breastfeeding Guide and Packet/Birth Certificate Instructions

## 2023-05-05 NOTE — OB PROVIDER LABOR PROGRESS NOTE - NSVAGINALEXAM_OBGYN_ALL_OB_DT
04-May-2023 00:52
05-May-2023 03:57
04-May-2023 09:15
05-May-2023 01:02
04-May-2023 18:45
04-May-2023 13:40
04-May-2023 22:51

## 2023-05-05 NOTE — OB PROVIDER DELIVERY SUMMARY - NSPROVIDERDELIVERYNOTE_OBGYN_ALL_OB_FT
Fetal tracing noted to have tachycardia to 170s x 2 hours, patient pushing for 1.2 hours and requested assistance. Risks/benefits reviewed. Marin catheter removed, fetal presentation confirmed at +2 station in OA presentation. Vacuum applied in correct positioning and confirmation no vaginal tissue within the vaccuum. Right mediolateral episiotomy created. Patient encouraged to push, pressure increased and with 2 pulls fetal delivered in OA presentation. Vaccuum removed without difficulty. Tight nuchal cord noted and not reduced. Compound presentation noted, anterior and posterior shoulder/arm delivered without difficulty. Fetal body delivered shortly after. Nuchal cord reduced. Mouth and nose suctioned. Cord clamped and cut, baby handed off to peds. Cord gases and blood collected. Placenta delivered spontaneously and intact. Bimanual massage performed. Pitocin 10u IM x1 dose given. Examination of perineum noted left periurethral laceration, right labial laceration and 3b perineal laceration. Additional anesthesia given via epidural. Periurethral and labial laceration reapproximated with 3-0 Vicryl suture with adequate hemostasis. Additional bright red bleeding noted, MIGUEL atony noted and bimanual massage with removal of clots performed. Hemabate IM x 1 dose given.   External anal sphincter reapproximated with interrupted suture of 0 Vicryl. Remaining laceration reapproximated in usual fashion with 2-0 Vicryl suture. Hemostasis achieved. Intermittent bright red bleeding noted, Cytotec PA given. No suture noted on rectal exam   Patient tolerated delivery well.

## 2023-05-05 NOTE — OB PROVIDER LABOR PROGRESS NOTE - ASSESSMENT
P0 admitted for IOL, now fully dilated. Pt repositioned and pitocin decreased. Will cont to monitor closely.    - cont present management   - cEFM/toco  - will begin to push  - anticipate     d/w Dr. Umana   
iupc does not appear to be functioning well.  irrigated with 2 cc saline and reset again  discussed with dr Umana who is coming  on.  Plan is to d/c pit for an hour, restart at about 8pm  patient is aware of the plan
Active Labor   -initiate pushing   -continue EFM and toco   -peds at delivery     GDMA1  -continue to monitor FS with rotating fluids     A Dong 
I just examined the patient to try to get the balloon out.  It is tightly in the cervix.  The cervix is maybe about 1 cm.  Not ready to come out yet.   will reevaluate in a few hours
IOL for A1  CAPILLARY BLOOD GLUCOSE  POCT Blood Glucose.: 88 mg/dL (04 May 2023 20:59)  - Pit @12  - MVU recently became adequate, continue pitocin and recheck at 2:30am    Kallie Gray, PGY1  d/w Dr. Umana  
IOL for A1  CAPILLARY BLOOD GLUCOSE  POCT Blood Glucose.: 123 mg/dL (04 May 2023 00:14)  POCT Blood Glucose.: 91 mg/dL (03 May 2023 19:57)  - Repeat POCT glucose at 1:15a  - s/p BC  - Start pitocin at 2:30a    Kallie Gray, PGY1  As per plan previously d/w Dr. Umana  
balloon removed  arom clear, copious fluid  nurse says that she would like iupc due to tachysystole making it difficult to titrate pitocin  iupc placed    cont present rx
IOL for A1  CAPILLARY BLOOD GLUCOSE  POCT Blood Glucose.: 88 mg/dL (04 May 2023 20:59)  - VE @230a  - Continue iglesia Gray, PGY1  As per plan previously d/w Dr. Umana

## 2023-05-05 NOTE — DISCHARGE NOTE OB - MEDICATION SUMMARY - MEDICATIONS TO TAKE
I will START or STAY ON the medications listed below when I get home from the hospital:    ibuprofen 600 mg oral tablet  -- 1 tab(s) by mouth every 6 hours  -- Indication: For Pain    acetaminophen 325 mg oral tablet  -- 3 tab(s) by mouth every 8 hours as needed for  mild pain  -- Indication: For Pain    Prenatal Multivitamins with Folic Acid 1 mg oral tablet  -- 1 tab(s) by mouth once a day  -- Indication: For wellness    ferrous sulfate 325 mg (65 mg elemental iron) oral tablet  -- 1 tab(s) by mouth 3 times a day  -- Indication: For anemia    levothyroxine 112 mcg (0.112 mg) oral tablet  -- 1 tab(s) by mouth once a day  -- Indication: For Hypothyroidism

## 2023-05-05 NOTE — DISCHARGE NOTE OB - CARE PROVIDER_API CALL
Niki Umana)  Obstetrics and Gynecology  [address] [place] [address]  Phone: [phone]  Fax: [phone]  Follow Up Time:

## 2023-05-05 NOTE — OB RN DELIVERY SUMMARY - BABY A: APGAR 1 MIN SCORE, DELIVERY
Does The Patient Take Blood Thinners?: No Referring Provider: Cory Mason Time Of Mohs Surgery: 10:45am Has The Pathology Report Been Received?: Yes Patient Reported Location: Right Superior Medial Malar Cheek Which Antibiotic Do They Take For Surgical Prophylaxis?: Amoxicillin (2 grams) Patient Preferred Phone Number: [phone] Office Location Of Mohs Surgery: [facility] Accession #: [id number] Patient's Insurance: [facility] Detail Level: Simple Date Of Mohs Surgery: 10/01/2020 Referring Provider Office Number: [phone]

## 2023-05-05 NOTE — OB PROVIDER DELIVERY SUMMARY - NSSELHIDDEN_OBGYN_ALL_OB_FT
[NS_[email or web address]_ALL_OB_FT:[email or web address]=],[NS_DeliveryAssist1_OBGYN_ALL_OB_FT:[email or web address]=],[NS_DeliveryRN_OBGYN_ALL_OB_FT:[id number]=]

## 2023-05-05 NOTE — OB NEONATOLOGY/PEDIATRICIAN DELIVERY SUMMARY - NSPEDSNEONOTESA_OBGYN_ALL_OB_FT
Peds requested to attend delivery for this 39.6wk AGA female born on 23 at 0542 via VAVD to a 31 y/o  blood type O+ mother.  Maternal history of gdma1, hypothyroidism (synthroid), heart murmur, psoriasis.  Prenatal history of polyhydramnios, resolved.  PNL HIV -/Hep B-/RPR non-reactive/Rubella immune, GBS - on 23.  AROM on 23 at 1340 with clear fluids, passed mec as she emerged.  Baby emerged vigorous, crying, was warmed/ dried/ suctioned/ stimulated with APGARS of 9/9; had a tight nuchal cord x1.  Mom plans to initiate breastfeeding, consents to Hep B vaccine.  Highest maternal temp 37.4C with EOS of 0.31.  Admitted under Dr. Venegas.

## 2023-05-06 LAB
BASOPHILS # BLD AUTO: 0.04 K/UL — SIGNIFICANT CHANGE UP (ref 0–0.2)
BASOPHILS NFR BLD AUTO: 0.3 % — SIGNIFICANT CHANGE UP (ref 0–2)
EOSINOPHIL # BLD AUTO: 0.1 K/UL — SIGNIFICANT CHANGE UP (ref 0–0.5)
EOSINOPHIL NFR BLD AUTO: 0.8 % — SIGNIFICANT CHANGE UP (ref 0–6)
HCT VFR BLD CALC: 28.7 % — LOW (ref 34.5–45)
HGB BLD-MCNC: 9.7 G/DL — LOW (ref 11.5–15.5)
IMM GRANULOCYTES NFR BLD AUTO: 0.7 % — SIGNIFICANT CHANGE UP (ref 0–0.9)
LYMPHOCYTES # BLD AUTO: 1.75 K/UL — SIGNIFICANT CHANGE UP (ref 1–3.3)
LYMPHOCYTES # BLD AUTO: 14.5 % — SIGNIFICANT CHANGE UP (ref 13–44)
MCHC RBC-ENTMCNC: 30.8 PG — SIGNIFICANT CHANGE UP (ref 27–34)
MCHC RBC-ENTMCNC: 33.8 GM/DL — SIGNIFICANT CHANGE UP (ref 32–36)
MCV RBC AUTO: 91.1 FL — SIGNIFICANT CHANGE UP (ref 80–100)
MONOCYTES # BLD AUTO: 0.87 K/UL — SIGNIFICANT CHANGE UP (ref 0–0.9)
MONOCYTES NFR BLD AUTO: 7.2 % — SIGNIFICANT CHANGE UP (ref 2–14)
NEUTROPHILS # BLD AUTO: 9.2 K/UL — HIGH (ref 1.8–7.4)
NEUTROPHILS NFR BLD AUTO: 76.5 % — SIGNIFICANT CHANGE UP (ref 43–77)
NRBC # BLD: 0 /100 WBCS — SIGNIFICANT CHANGE UP (ref 0–0)
PLATELET # BLD AUTO: 131 K/UL — LOW (ref 150–400)
RBC # BLD: 3.15 M/UL — LOW (ref 3.8–5.2)
RBC # FLD: 13.2 % — SIGNIFICANT CHANGE UP (ref 10.3–14.5)
WBC # BLD: 12.04 K/UL — HIGH (ref 3.8–10.5)
WBC # FLD AUTO: 12.04 K/UL — HIGH (ref 3.8–10.5)

## 2023-05-06 RX ORDER — IBUPROFEN 200 MG
1 TABLET ORAL
Qty: 0 | Refills: 0 | DISCHARGE
Start: 2023-05-06

## 2023-05-06 RX ORDER — FERROUS SULFATE 325(65) MG
325 TABLET ORAL THREE TIMES A DAY
Refills: 0 | Status: DISCONTINUED | OUTPATIENT
Start: 2023-05-06 | End: 2023-05-07

## 2023-05-06 RX ORDER — FERROUS SULFATE 325(65) MG
1 TABLET ORAL
Qty: 0 | Refills: 0 | DISCHARGE
Start: 2023-05-06

## 2023-05-06 RX ORDER — LEVOTHYROXINE SODIUM 125 MCG
1 TABLET ORAL
Qty: 0 | Refills: 0 | DISCHARGE
Start: 2023-05-06

## 2023-05-06 RX ORDER — SENNA PLUS 8.6 MG/1
2 TABLET ORAL DAILY
Refills: 0 | Status: DISCONTINUED | OUTPATIENT
Start: 2023-05-06 | End: 2023-05-07

## 2023-05-06 RX ORDER — POLYETHYLENE GLYCOL 3350 17 G/17G
17 POWDER, FOR SOLUTION ORAL DAILY
Refills: 0 | Status: DISCONTINUED | OUTPATIENT
Start: 2023-05-06 | End: 2023-05-07

## 2023-05-06 RX ORDER — ASCORBIC ACID 60 MG
500 TABLET,CHEWABLE ORAL THREE TIMES A DAY
Refills: 0 | Status: DISCONTINUED | OUTPATIENT
Start: 2023-05-06 | End: 2023-05-07

## 2023-05-06 RX ORDER — ACETAMINOPHEN 500 MG
3 TABLET ORAL
Qty: 0 | Refills: 0 | DISCHARGE
Start: 2023-05-06

## 2023-05-06 RX ADMIN — Medication 1 TABLET(S): at 11:33

## 2023-05-06 RX ADMIN — Medication 325 MILLIGRAM(S): at 14:47

## 2023-05-06 RX ADMIN — Medication 975 MILLIGRAM(S): at 14:47

## 2023-05-06 RX ADMIN — Medication 975 MILLIGRAM(S): at 21:21

## 2023-05-06 RX ADMIN — Medication 975 MILLIGRAM(S): at 08:35

## 2023-05-06 RX ADMIN — Medication 600 MILLIGRAM(S): at 23:39

## 2023-05-06 RX ADMIN — Medication 600 MILLIGRAM(S): at 06:57

## 2023-05-06 RX ADMIN — Medication 600 MILLIGRAM(S): at 01:30

## 2023-05-06 RX ADMIN — Medication 975 MILLIGRAM(S): at 20:51

## 2023-05-06 RX ADMIN — Medication 325 MILLIGRAM(S): at 20:51

## 2023-05-06 RX ADMIN — Medication 112 MICROGRAM(S): at 06:47

## 2023-05-06 RX ADMIN — POLYETHYLENE GLYCOL 3350 17 GRAM(S): 17 POWDER, FOR SOLUTION ORAL at 11:33

## 2023-05-06 RX ADMIN — Medication 600 MILLIGRAM(S): at 12:25

## 2023-05-06 RX ADMIN — Medication 975 MILLIGRAM(S): at 09:30

## 2023-05-06 RX ADMIN — Medication 975 MILLIGRAM(S): at 03:29

## 2023-05-06 RX ADMIN — Medication 975 MILLIGRAM(S): at 15:49

## 2023-05-06 RX ADMIN — Medication 600 MILLIGRAM(S): at 00:59

## 2023-05-06 RX ADMIN — Medication 600 MILLIGRAM(S): at 06:27

## 2023-05-06 RX ADMIN — Medication 500 MILLIGRAM(S): at 14:47

## 2023-05-06 RX ADMIN — Medication 500 MILLIGRAM(S): at 20:51

## 2023-05-06 RX ADMIN — Medication 600 MILLIGRAM(S): at 17:28

## 2023-05-06 RX ADMIN — Medication 975 MILLIGRAM(S): at 04:00

## 2023-05-06 RX ADMIN — Medication 600 MILLIGRAM(S): at 11:33

## 2023-05-06 RX ADMIN — Medication 600 MILLIGRAM(S): at 18:27

## 2023-05-06 NOTE — PROGRESS NOTE ADULT - ATTENDING COMMENTS
Pt seen on am rounds and note reviewed  VSS AF  Fundus firm U-2  Ext: trace edmea, no cords  A/P: PPD 1 s/p VAVD  routine care  d/c susan Pugh MD

## 2023-05-06 NOTE — CHART NOTE - NSCHARTNOTEFT_GEN_A_CORE
Patient seen for: nutrition consult for GDM postpartum education prior to discharge    Source: patient, EMR    Patient is: ; GDM [A1]    Chart reviewed, events noted. Meds/Labs reviewed.    Anthropometrics:  Height: 61 inches  Pre-pregnancy weight: 160 pounds   Weight gain: 35 pounds   Admit/Dosing wt: 195 pounds     Nutrition Diagnosis:   Food and Nutrition Related Knowledge Deficit related to limited previous exposure to postpartum GDM nutrition education and recommendations as evidenced by GDM postpartum.    Goal: Pt to state at least two teach back points.    Intervention:  1. Education: Pt educated on postpartum dietary recommendations, including risk of development of T2DM; reinforced importance of DM screening 4-12 weeks postpartum. Reviewed nutrition recommendations for breast feeding, including adequate protein, calorie, and fluid intake.   2. Handout: "What to expect now that you have had your baby"     Monitoring:  No other nutrition risks were identified during this encounter.  RD remains available upon request and will follow up per protocol.  RIZWAN No [facility] #[phone] or TEAMS
Asked to evaluate pt prior to going to floor. Pt has no complaints. Headache resolved with Toradol. Tolerating clears. Not OOB. DTV.  T(C): 37 (05-05-23 @ 09:03), Max: 37.4 (05-04-23 @ 18:49)  HR: 77 (05-05-23 @ 09:13) (66 - 149)  BP: 122/64 (05-05-23 @ 09:03) (103/56 - 179/101)  RR: 18 (05-05-23 @ 09:03) (18 - 20)  SpO2: 92% (05-05-23 @ 09:13) (71% - 100%)  Gen: NAD  CV: NRRR  Lungs: CTA  Abd: soft, fundus firm, >umbilicus  Lochia minimal  SP VAVD with RML, third degree laceration, EBL 600ml, sp Pitocin, cytotec, hemabate  pt to be given bedpan  ok to go to floor  Rocío Mireles PAC
PA NOTE     Day 1              Vital Signs Last 24 Hrs  T(C): 36.8 (06 May 2023 09:20), Max: 36.9 (05 May 2023 14:05)  T(F): 98.2 (06 May 2023 09:20), Max: 98.5 (05 May 2023 14:05)  HR: 82 (06 May 2023 09:20) (63 - 88)  BP: 101/63 (06 May 2023 09:20) (100/68 - 134/85)  BP(mean): --  RR: 18 (06 May 2023 09:20) (18 - 18)  SpO2: 97% (06 May 2023 09:20) (96% - 98%)    Parameters below as of 06 May 2023 06:20  Patient On (Oxygen Delivery Method): room air                   9.7    12.04 )-----------( 131      ( 05-06 @ 06:47 )             28.7                13.8   9.77  )-----------( 212      ( 05-03 @ 20:16 )             40.5           Assessment: Anemia secondary to acute blood loss due to delivery    Plan:  - Ferrous Sulfate, Vitamin C supplementation.  - Monitor for signs/symptoms of anemia.   - Does not require transfusion at this time

## 2023-05-07 VITALS
HEART RATE: 82 BPM | RESPIRATION RATE: 18 BRPM | DIASTOLIC BLOOD PRESSURE: 59 MMHG | TEMPERATURE: 98 F | OXYGEN SATURATION: 98 % | SYSTOLIC BLOOD PRESSURE: 109 MMHG

## 2023-05-07 LAB
HCT VFR BLD CALC: 27.5 % — LOW (ref 34.5–45)
HGB BLD-MCNC: 9.1 G/DL — LOW (ref 11.5–15.5)
MCHC RBC-ENTMCNC: 30.3 PG — SIGNIFICANT CHANGE UP (ref 27–34)
MCHC RBC-ENTMCNC: 33.1 GM/DL — SIGNIFICANT CHANGE UP (ref 32–36)
MCV RBC AUTO: 91.7 FL — SIGNIFICANT CHANGE UP (ref 80–100)
NRBC # BLD: 0 /100 WBCS — SIGNIFICANT CHANGE UP (ref 0–0)
PLATELET # BLD AUTO: 162 K/UL — SIGNIFICANT CHANGE UP (ref 150–400)
RBC # BLD: 3 M/UL — LOW (ref 3.8–5.2)
RBC # FLD: 13.4 % — SIGNIFICANT CHANGE UP (ref 10.3–14.5)
WBC # BLD: 10.55 K/UL — HIGH (ref 3.8–10.5)
WBC # FLD AUTO: 10.55 K/UL — HIGH (ref 3.8–10.5)

## 2023-05-07 PROCEDURE — 86850 RBC ANTIBODY SCREEN: CPT

## 2023-05-07 PROCEDURE — 86901 BLOOD TYPING SEROLOGIC RH(D): CPT

## 2023-05-07 PROCEDURE — 88307 TISSUE EXAM BY PATHOLOGIST: CPT

## 2023-05-07 PROCEDURE — 86900 BLOOD TYPING SEROLOGIC ABO: CPT

## 2023-05-07 PROCEDURE — 82962 GLUCOSE BLOOD TEST: CPT

## 2023-05-07 PROCEDURE — 59050 FETAL MONITOR W/REPORT: CPT

## 2023-05-07 PROCEDURE — 86780 TREPONEMA PALLIDUM: CPT

## 2023-05-07 PROCEDURE — 85025 COMPLETE CBC W/AUTO DIFF WBC: CPT

## 2023-05-07 PROCEDURE — 90707 MMR VACCINE SC: CPT

## 2023-05-07 PROCEDURE — 36415 COLL VENOUS BLD VENIPUNCTURE: CPT

## 2023-05-07 PROCEDURE — 86769 SARS-COV-2 COVID-19 ANTIBODY: CPT

## 2023-05-07 PROCEDURE — 85027 COMPLETE CBC AUTOMATED: CPT

## 2023-05-07 RX ADMIN — Medication 600 MILLIGRAM(S): at 12:40

## 2023-05-07 RX ADMIN — Medication 600 MILLIGRAM(S): at 00:10

## 2023-05-07 RX ADMIN — Medication 600 MILLIGRAM(S): at 05:27

## 2023-05-07 RX ADMIN — Medication 975 MILLIGRAM(S): at 09:12

## 2023-05-07 RX ADMIN — Medication 600 MILLIGRAM(S): at 12:07

## 2023-05-07 RX ADMIN — Medication 0.5 MILLILITER(S): at 11:40

## 2023-05-07 RX ADMIN — Medication 500 MILLIGRAM(S): at 05:27

## 2023-05-07 RX ADMIN — Medication 325 MILLIGRAM(S): at 05:27

## 2023-05-07 RX ADMIN — Medication 975 MILLIGRAM(S): at 09:45

## 2023-05-07 RX ADMIN — Medication 112 MICROGRAM(S): at 05:28

## 2023-05-07 RX ADMIN — Medication 975 MILLIGRAM(S): at 02:27

## 2023-05-07 RX ADMIN — Medication 975 MILLIGRAM(S): at 02:57

## 2023-05-07 RX ADMIN — Medication 600 MILLIGRAM(S): at 05:57

## 2023-05-07 NOTE — PROGRESS NOTE ADULT - SUBJECTIVE AND OBJECTIVE BOX
OB Progress Note: VAVD PPD#1    S: 31yo PPD#1 s/p VAVD with third degree laceration, . Patient feels well. Pain is well controlled. She is tolerating a regular diet and passing flatus. She is voiding spontaneously, and ambulating without difficulty. Denies CP/SOB. Denies lightheadedness/dizziness. Denies N/V.    O:  Vitals:  Vital Signs Last 24 Hrs  T(C): 36.4 (06 May 2023 06:20), Max: 37 (05 May 2023 09:03)  T(F): 97.5 (06 May 2023 06:20), Max: 98.6 (05 May 2023 09:03)  HR: 84 (06 May 2023 06:20) (63 - 107)  BP: 100/68 (06 May 2023 06:20) (100/68 - 134/85)  RR: 18 (06 May 2023 06:20) (18 - 18)  SpO2: 96% (06 May 2023 06:20) (71% - 100%)    Parameters below as of 06 May 2023 06:20  Patient On (Oxygen Delivery Method): room air    MEDICATIONS  (STANDING):  acetaminophen     Tablet .. 975 milliGRAM(s) Oral <User Schedule>  diphtheria/tetanus/pertussis (acellular) Vaccine (Adacel) 0.5 milliLiter(s) IntraMuscular once  ibuprofen  Tablet. 600 milliGRAM(s) Oral every 6 hours  levothyroxine 112 MICROGram(s) Oral daily  measles/mumps/rubella Vaccine 0.5 milliLiter(s) SubCutaneous once  methylergonovine Injectable 0.2 milliGRAM(s) IntraMuscular once  oxytocin Infusion 333.333 milliUNIT(s)/Min (1000 mL/Hr) IV Continuous <Continuous>  oxytocin Infusion 41.667 milliUNIT(s)/Min (125 mL/Hr) IV Continuous <Continuous>  prenatal multivitamin 1 Tablet(s) Oral daily  sodium chloride 0.9% lock flush 3 milliLiter(s) IV Push every 8 hours    Labs:  Blood type: O Positive  Rubella IgG: RPR: Negative                          9.7<L>   12.04<H> >-----------< 131<L>    ( 05-06 @ 06:47 )             28.7<L>                        13.8   9.77 >-----------< 212    ( 05-03 @ 20:16 )             40.5      Physical Exam:  General: NAD  Abdomen: soft, non-tender, non-distended, fundus firm  Vaginal: Lochia wnl  Extremities: No erythema/edema
R1 Progress Note    Patient seen and examined at bedside, no acute overnight events. No acute complaints, pain well controlled. Patient is ambulating, voiding spontaneously, passing gas, and tolerating regular diet. Denies CP, SOB, N/V, HA, blurred vision, epigastric pain.    Vital Signs Last 24 Hours  T(C): 36.7 (05-07-23 @ 00:53), Max: 36.9 (05-06-23 @ 17:00)  HR: 87 (05-07-23 @ 00:53) (82 - 87)  BP: 113/70 (05-07-23 @ 00:53) (100/68 - 126/78)  RR: 18 (05-07-23 @ 00:53) (18 - 18)  SpO2: 98% (05-07-23 @ 00:53) (96% - 99%)    Physical Exam:  General: NAD  Abdomen: Soft, non-tender, non-distended, fundus firm  Pelvic: Lochia wnl    Labs:    Blood Type: O Positive  Antibody Screen: Negative  RPR: Negative               9.7    12.04 )-----------( 131      ( 05-06 @ 06:47 )             28.7                13.8   9.77  )-----------( 212      ( 05-03 @ 20:16 )             40.5         MEDICATIONS  (STANDING):  acetaminophen     Tablet .. 975 milliGRAM(s) Oral <User Schedule>  ascorbic acid 500 milliGRAM(s) Oral three times a day  diphtheria/tetanus/pertussis (acellular) Vaccine (Adacel) 0.5 milliLiter(s) IntraMuscular once  ferrous    sulfate 325 milliGRAM(s) Oral three times a day  ibuprofen  Tablet. 600 milliGRAM(s) Oral every 6 hours  levothyroxine 112 MICROGram(s) Oral daily  measles/mumps/rubella Vaccine 0.5 milliLiter(s) SubCutaneous once  methylergonovine Injectable 0.2 milliGRAM(s) IntraMuscular once  oxytocin Infusion 333.333 milliUNIT(s)/Min (1000 mL/Hr) IV Continuous <Continuous>  oxytocin Infusion 41.667 milliUNIT(s)/Min (125 mL/Hr) IV Continuous <Continuous>  polyethylene glycol 3350 17 Gram(s) Oral daily  prenatal multivitamin 1 Tablet(s) Oral daily  senna 2 Tablet(s) Oral daily  sodium chloride 0.9% lock flush 3 milliLiter(s) IV Push every 8 hours    MEDICATIONS  (PRN):  benzocaine 20%/menthol 0.5% Spray 1 Spray(s) Topical every 6 hours PRN for Perineal discomfort  dibucaine 1% Ointment 1 Application(s) Topical every 6 hours PRN Perineal discomfort  diphenhydrAMINE 25 milliGRAM(s) Oral every 6 hours PRN Pruritus  hydrocortisone 1% Cream 1 Application(s) Topical every 6 hours PRN Moderate Pain (4-6)  lanolin Ointment 1 Application(s) Topical every 6 hours PRN nipple soreness  magnesium hydroxide Suspension 30 milliLiter(s) Oral two times a day PRN Constipation  oxyCODONE    IR 5 milliGRAM(s) Oral every 3 hours PRN Moderate to Severe Pain (4-10)  oxyCODONE    IR 5 milliGRAM(s) Oral once PRN Moderate to Severe Pain (4-10)  pramoxine 1%/zinc 5% Cream 1 Application(s) Topical every 4 hours PRN Moderate Pain (4-6)  simethicone 80 milliGRAM(s) Chew every 4 hours PRN Gas  witch hazel Pads 1 Application(s) Topical every 4 hours PRN Perineal discomfort

## 2023-05-07 NOTE — PROGRESS NOTE ADULT - ASSESSMENT
A/P: 33yo PPD#2 s/p VAVD with third degree laceration, . Patient is stable and doing well post-partum.   - Pain well controlled, continue current pain regimen  - Increase ambulation, SCDs when not ambulating  - Continue low fiber diet  - Bowel regimen with senna, Miralax  - Hct 40.5->28.7; f/u AM CBC    Maurice Wynn, PGY-1  
A/P: 33yo PPD#1 s/p VAVD with third degree laceration, . Patient is stable and doing well post-partum.   - Pain well controlled, continue current pain regimen  - Increase ambulation, SCDs when not ambulating  - Continue low fiber diet  - Bowel regimen with senna, Miralax  - Hct 40.5->28.7    Elham Mary, PGY1

## 2023-05-07 NOTE — PROGRESS NOTE ADULT - ATTENDING COMMENTS
P t seen on am rounds nad resident note reviewed  VSS AF  fundus firm U-2  ext: no edems  H/H 9.1/27.3  A/P: PPD 2 s/p   Stable for discharge  MMR vaccine  John Pugh MD

## 2023-05-12 ENCOUNTER — APPOINTMENT (OUTPATIENT)
Dept: AFTER HOURS CARE | Facility: EMERGENCY ROOM | Age: 33
End: 2023-05-12
Payer: COMMERCIAL

## 2023-05-12 PROCEDURE — 99204 OFFICE O/P NEW MOD 45 MIN: CPT | Mod: 95

## 2023-05-13 PROBLEM — E03.9 HYPOTHYROIDISM, UNSPECIFIED: Chronic | Status: ACTIVE | Noted: 2023-05-03

## 2023-05-13 PROBLEM — R01.1 CARDIAC MURMUR, UNSPECIFIED: Chronic | Status: ACTIVE | Noted: 2023-05-03

## 2023-05-13 PROBLEM — L40.9 PSORIASIS, UNSPECIFIED: Chronic | Status: ACTIVE | Noted: 2023-05-03

## 2023-05-31 LAB — SURGICAL PATHOLOGY STUDY: SIGNIFICANT CHANGE UP

## 2023-06-08 LAB
ESTIMATED AVERAGE GLUCOSE: 108 MG/DL
HBA1C MFR BLD HPLC: 5.4 %
T3FREE SERPL-MCNC: 2.24 PG/ML
T4 FREE SERPL-MCNC: 1.4 NG/DL
TSH SERPL-ACNC: 2.33 UIU/ML

## 2023-08-24 ENCOUNTER — TRANSCRIPTION ENCOUNTER (OUTPATIENT)
Age: 33
End: 2023-08-24

## 2023-09-01 ENCOUNTER — APPOINTMENT (OUTPATIENT)
Dept: ENDOCRINOLOGY | Facility: CLINIC | Age: 33
End: 2023-09-01
Payer: COMMERCIAL

## 2023-09-01 VITALS
WEIGHT: 170.56 LBS | SYSTOLIC BLOOD PRESSURE: 122 MMHG | HEIGHT: 61 IN | DIASTOLIC BLOOD PRESSURE: 78 MMHG | BODY MASS INDEX: 32.2 KG/M2 | TEMPERATURE: 98.5 F | HEART RATE: 75 BPM | OXYGEN SATURATION: 96 %

## 2023-09-01 DIAGNOSIS — R74.8 ABNORMAL LEVELS OF OTHER SERUM ENZYMES: ICD-10-CM

## 2023-09-01 PROCEDURE — 99214 OFFICE O/P EST MOD 30 MIN: CPT

## 2023-09-03 NOTE — PLAN
[No new medications perscribed] : Treat in place: No new medications prescribed [FreeTextEntry1] : -Take stool softeners, increase fluid intake, increase fiber intake -f/u with OB -Red flag symptoms for immediate ER evaluation discussed. pt expresses full understanding and agrees.

## 2023-09-03 NOTE — HISTORY OF PRESENT ILLNESS
[Home] : at home, [unfilled] , at the time of the visit. [Other Location: e.g. Home (Enter Location, City,State)___] : at [unfilled] [Verbal consent obtained from patient] : the patient, [unfilled] [FreeTextEntry8] : best friend - nurse norris assisting with history 33 yo female s/p  3rd degree tear 1 week ago, for evaluation of constipation. Reports hasn't had a full bowel movement since discharge. Tried 2 fleet enemas given, without significant improvement. Norris nurse reports she has tried fecal disimpaction with minimal improvement. Denies fevers, chills. States abd cramping. During telehealth call, patient had a large bowel movement.  General: no acute distress Lungs: speaking in full sentences, no respiratory distress Abd: no distention, negative ttp on physician guided self exam, +bowel sounds in all quadrants (per nurse Norris) Neurology: AAOx3 Psych: normal affect

## 2023-09-08 NOTE — HISTORY OF PRESENT ILLNESS
[FreeTextEntry1] : Ms. ANTON RONDON is a 33-year-old female who returns today in follow up with regard to a history of hypothyroidism. She is currently taking Levothyroxine 112 mcg daily. Delivered on 05/05/2023. She is currently nursing. Returned to previous dose of LT4 112 mcg on the day of delivery.   She has been compliant in taking the LT4 daily, away from food or any medication that may inhibit absorption. She has tolerated this medication well. Without any apparent adverse effects. She denies any temperature intolerance, significant weight changes, or severe fatigue. She in addition denies any palpitations, tremors, anxiousness, change in bowel habits or significant change in moods.  labs dated 08/30/2023: TSH: 0.153 FT4: 2.0 FT3: 2.88 Vitamin D: 47.7 HgA1C: 5.5 Alkaline phosphate: 116 ( high based on [facility] medical lab)  Patient did had hx of Gestational diabetes controlled with diet.   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  Too with psoriasis- off ketoconazole, is using topical HC as needed. .  + Underlying Hashimoto's  OBGYN: Dr. Warren  Denies covid infection.

## 2023-09-18 ENCOUNTER — TRANSCRIPTION ENCOUNTER (OUTPATIENT)
Age: 33
End: 2023-09-18

## 2023-09-19 ENCOUNTER — TRANSCRIPTION ENCOUNTER (OUTPATIENT)
Age: 33
End: 2023-09-19

## 2023-09-22 ENCOUNTER — TRANSCRIPTION ENCOUNTER (OUTPATIENT)
Age: 33
End: 2023-09-22

## 2023-10-02 ENCOUNTER — TRANSCRIPTION ENCOUNTER (OUTPATIENT)
Age: 33
End: 2023-10-02

## 2023-10-16 LAB
T3FREE SERPL-MCNC: 2.29 PG/ML
T4 FREE SERPL-MCNC: 1.4 NG/DL
TSH SERPL-ACNC: 8.77 UIU/ML

## 2023-11-17 LAB
T3FREE SERPL-MCNC: 2.05 PG/ML
T4 FREE SERPL-MCNC: 1.6 NG/DL
TSH SERPL-ACNC: 8.88 UIU/ML

## 2023-11-17 RX ORDER — LEVOTHYROXINE SODIUM 0.11 MG/1
112 TABLET ORAL DAILY
Qty: 90 | Refills: 0 | Status: DISCONTINUED | COMMUNITY
Start: 2023-04-17 | End: 2023-11-17

## 2023-11-17 RX ORDER — LEVOTHYROXINE SODIUM 0.1 MG/1
100 TABLET ORAL DAILY
Qty: 90 | Refills: 2 | Status: DISCONTINUED | COMMUNITY
Start: 2022-11-02 | End: 2023-11-17

## 2023-12-04 ENCOUNTER — TRANSCRIPTION ENCOUNTER (OUTPATIENT)
Age: 33
End: 2023-12-04

## 2023-12-26 LAB
T3FREE SERPL-MCNC: 2.74 PG/ML
T4 FREE SERPL-MCNC: 2 NG/DL
TSH SERPL-ACNC: 2.24 UIU/ML

## 2024-01-05 ENCOUNTER — APPOINTMENT (OUTPATIENT)
Dept: ENDOCRINOLOGY | Facility: CLINIC | Age: 34
End: 2024-01-05
Payer: COMMERCIAL

## 2024-01-05 VITALS
WEIGHT: 168 LBS | DIASTOLIC BLOOD PRESSURE: 60 MMHG | HEART RATE: 65 BPM | HEIGHT: 61 IN | SYSTOLIC BLOOD PRESSURE: 130 MMHG | OXYGEN SATURATION: 98 % | BODY MASS INDEX: 31.72 KG/M2

## 2024-01-05 DIAGNOSIS — E06.3 AUTOIMMUNE THYROIDITIS: ICD-10-CM

## 2024-01-05 DIAGNOSIS — O24.419 GESTATIONAL DIABETES MELLITUS IN PREGNANCY, UNSPECIFIED CONTROL: ICD-10-CM

## 2024-01-05 DIAGNOSIS — E55.9 VITAMIN D DEFICIENCY, UNSPECIFIED: ICD-10-CM

## 2024-01-05 PROCEDURE — 99214 OFFICE O/P EST MOD 30 MIN: CPT | Mod: 25

## 2024-01-05 PROCEDURE — 36415 COLL VENOUS BLD VENIPUNCTURE: CPT

## 2024-01-07 PROBLEM — O24.419 GESTATIONAL DIABETES: Status: ACTIVE | Noted: 2023-03-24

## 2024-01-07 PROBLEM — E55.9 VITAMIN D DEFICIENCY: Status: ACTIVE | Noted: 2020-08-11

## 2024-01-07 PROBLEM — E06.3 HASHIMOTO'S DISEASE: Status: ACTIVE | Noted: 2020-12-14

## 2024-01-07 NOTE — HISTORY OF PRESENT ILLNESS
[FreeTextEntry1] : Ms. ANTON RONDON is a 33-year-old female who returns today in follow up with regard to a history of hypothyroidism. She is currently taking Levothyroxine 125 mcg daily. Delivered on 05/05/2023. She is currently nursing.   Labs 12/23/23  TSH: 2.24 Free T4: 2.0  Fee t3: 2.74   She has been compliant in taking the LT4 daily, away from food or any medication that may inhibit absorption. She has tolerated this medication well. Without any apparent adverse effects. She denies any temperature intolerance, significant weight changes, or severe fatigue. She in addition denies any palpitations, tremors, anxiousness, change in bowel habits or significant change in moods.  Patient did had hx of Gestational diabetes controlled with diet.  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 .  + Underlying Hashimoto's  OBGYN: Dr. Warren  Denies covid infection.

## 2024-01-07 NOTE — PHYSICAL EXAM
[Alert] : alert [Well Nourished] : well nourished [No Acute Distress] : no acute distress [Well Developed] : well developed [Normal Sclera/Conjunctiva] : normal sclera/conjunctiva [EOMI] : extra ocular movement intact [No Proptosis] : no proptosis [Normal Oropharynx] : the oropharynx was normal [Thyroid Not Enlarged] : the thyroid was not enlarged [No Thyroid Nodules] : no palpable thyroid nodules [No Respiratory Distress] : no respiratory distress [No Accessory Muscle Use] : no accessory muscle use [Clear to Auscultation] : lungs were clear to auscultation bilaterally [Normal S1, S2] : normal S1 and S2 [Normal Rate] : heart rate was normal [Regular Rhythm] : with a regular rhythm [No Edema] : no peripheral edema [Pedal Pulses Normal] : the pedal pulses are present [Normal Bowel Sounds] : normal bowel sounds [Not Tender] : non-tender [Not Distended] : not distended [Soft] : abdomen soft [Normal Anterior Cervical Nodes] : no anterior cervical lymphadenopathy [Normal Posterior Cervical Nodes] : no posterior cervical lymphadenopathy [No Spinal Tenderness] : no spinal tenderness [Spine Straight] : spine straight [No Stigmata of Cushings Syndrome] : no stigmata of Cushings Syndrome [Normal Gait] : normal gait [Normal Strength/Tone] : muscle strength and tone were normal [No Rash] : no rash [Normal Reflexes] : deep tendon reflexes were 2+ and symmetric [No Tremors] : no tremors [Oriented x3] : oriented to person, place, and time [Acanthosis Nigricans] : no acanthosis nigricans yes

## 2024-01-08 LAB
25(OH)D3 SERPL-MCNC: 42.3 NG/ML
HCG SERPL-MCNC: <1 MIU/ML
T3FREE SERPL-MCNC: 2.22 PG/ML
T4 FREE SERPL-MCNC: 1.7 NG/DL
THYROGLOB AB SERPL-ACNC: 39.2 IU/ML
THYROPEROXIDASE AB SERPL IA-ACNC: 393 IU/ML
TSH SERPL-ACNC: 3.73 UIU/ML
VIT B12 SERPL-MCNC: 869 PG/ML

## 2024-02-02 ENCOUNTER — TRANSCRIPTION ENCOUNTER (OUTPATIENT)
Age: 34
End: 2024-02-02

## 2024-02-27 ENCOUNTER — LABORATORY RESULT (OUTPATIENT)
Age: 34
End: 2024-02-27

## 2024-04-02 LAB
T3FREE SERPL-MCNC: 2.51 PG/ML
T4 FREE SERPL-MCNC: 1.7 NG/DL
TSH SERPL-ACNC: 1.86 UIU/ML

## 2024-04-22 LAB — HCG SERPL-MCNC: ABNORMAL MIU/ML

## 2024-05-06 DIAGNOSIS — E03.9 HYPOTHYROIDISM, UNSPECIFIED: ICD-10-CM

## 2024-05-06 LAB
T3FREE SERPL-MCNC: 2.23 PG/ML
T4 FREE SERPL-MCNC: 1.6 NG/DL
TSH SERPL-ACNC: 2.98 UIU/ML

## 2024-05-06 RX ORDER — LEVOTHYROXINE SODIUM 0.15 MG/1
150 TABLET ORAL DAILY
Qty: 90 | Refills: 1 | Status: ACTIVE | COMMUNITY
Start: 2024-05-06 | End: 1900-01-01

## 2024-05-11 ENCOUNTER — RX RENEWAL (OUTPATIENT)
Age: 34
End: 2024-05-11

## 2024-05-11 RX ORDER — LEVOTHYROXINE SODIUM 0.12 MG/1
125 TABLET ORAL DAILY
Qty: 90 | Refills: 0 | Status: ACTIVE | COMMUNITY
Start: 2023-11-17 | End: 1900-01-01

## 2024-06-04 DIAGNOSIS — E03.9 ENDOCRINE, NUTRITIONAL AND METABOLIC DISEASES COMPLICATING PREGNANCY, UNSPECIFIED TRIMESTER: ICD-10-CM

## 2024-06-04 DIAGNOSIS — O99.280 ENDOCRINE, NUTRITIONAL AND METABOLIC DISEASES COMPLICATING PREGNANCY, UNSPECIFIED TRIMESTER: ICD-10-CM

## 2024-06-04 LAB
T3FREE SERPL-MCNC: 2.66 PG/ML
T4 FREE SERPL-MCNC: 1.7 NG/DL
TSH SERPL-ACNC: 1.13 UIU/ML

## 2024-07-11 DIAGNOSIS — E03.9 HYPOTHYROIDISM, UNSPECIFIED: ICD-10-CM

## 2024-07-30 ENCOUNTER — ASOB RESULT (OUTPATIENT)
Age: 34
End: 2024-07-30

## 2024-07-30 ENCOUNTER — APPOINTMENT (OUTPATIENT)
Dept: ANTEPARTUM | Facility: CLINIC | Age: 34
End: 2024-07-30
Payer: COMMERCIAL

## 2024-07-30 PROCEDURE — 76811 OB US DETAILED SNGL FETUS: CPT

## 2024-08-19 DIAGNOSIS — E03.9 HYPOTHYROIDISM, UNSPECIFIED: ICD-10-CM

## 2024-08-22 NOTE — OB NEONATOLOGY/PEDIATRICIAN DELIVERY SUMMARY - NSLABSCHECK_OBGYN_ALL_OB
08/22/24 1038   Findings   Baseline 125 BPM   Baseline Classification Normal   Variability 6-25 BPM   Decelerations None   Accelerations Yes   Acoustic Stimulator No   Fetal Movement Yes   Multiple Gestation? No   Uterine contractions/10 min. 1   Interpretation Reactive   Interpreted by [facility]/Dr. Nugent   $Non Stress Test Charge  Yes       
Dr. Nugent at bedside, POC discussed with pt. After completion of the Medical Screening Evaluation, it has been determined that a medical emergency does not exist and the patient is not in active labor, and therefore the patient may be discharged home.  Ambulatory to be discharged to home now.  
Dr. Nugent viewed NST.   
Pt arrived to triage. Pt complain of itching.  Labs drawn outpatient, not resulted yet. Pt reports fetal movement. Pt reports movement has become less. Pt placed on EFM. Pt oriented to room and call light. Pt denies any questions at this time. Call light in reach.   
Yes

## 2024-10-01 ENCOUNTER — APPOINTMENT (OUTPATIENT)
Dept: ENDOCRINOLOGY | Facility: CLINIC | Age: 34
End: 2024-10-01
Payer: COMMERCIAL

## 2024-10-01 VITALS
HEIGHT: 61 IN | TEMPERATURE: 98 F | BODY MASS INDEX: 33.64 KG/M2 | HEART RATE: 63 BPM | SYSTOLIC BLOOD PRESSURE: 120 MMHG | DIASTOLIC BLOOD PRESSURE: 80 MMHG | WEIGHT: 178.19 LBS | OXYGEN SATURATION: 97 %

## 2024-10-01 DIAGNOSIS — E03.9 ENDOCRINE, NUTRITIONAL AND METABOLIC DISEASES COMPLICATING PREGNANCY, UNSPECIFIED TRIMESTER: ICD-10-CM

## 2024-10-01 DIAGNOSIS — E55.9 VITAMIN D DEFICIENCY, UNSPECIFIED: ICD-10-CM

## 2024-10-01 DIAGNOSIS — E06.3 AUTOIMMUNE THYROIDITIS: ICD-10-CM

## 2024-10-01 DIAGNOSIS — O24.419 GESTATIONAL DIABETES MELLITUS IN PREGNANCY, UNSPECIFIED CONTROL: ICD-10-CM

## 2024-10-01 DIAGNOSIS — O99.280 ENDOCRINE, NUTRITIONAL AND METABOLIC DISEASES COMPLICATING PREGNANCY, UNSPECIFIED TRIMESTER: ICD-10-CM

## 2024-10-01 PROCEDURE — 99214 OFFICE O/P EST MOD 30 MIN: CPT

## 2024-10-01 PROCEDURE — G2211 COMPLEX E/M VISIT ADD ON: CPT | Mod: NC

## 2024-10-06 NOTE — ADDENDUM
[FreeTextEntry1] : Blood will be drawn in office today.  This note was written by Erica Marmolejo on 10/01/2024 acting as medical scribe for Dr. Stephane Tinoco. I, Dr. Stephane Tinoco, have read and attest that all the information, medical decision making and discharge instructions within are true and accurate.

## 2024-10-06 NOTE — HISTORY OF PRESENT ILLNESS
[FreeTextEntry1] : Ms. ANTON RONDON is a 34-year-old female who returns today in follow up with regard to a history of hypothyroidism + Underlying Hashimoto's.   First pregnancy: Delivered on 05/05/2023, daughter born at 7lbs 15 oz. During this pregnancy dose was up to 137 mcg daily.   She is now pregnant with second baby. She is currently 29 weeks gestational with estimated due date of December 16, 2024. She will deliver at [facility].   Dose was adjusted from 125mcg pre-pregnancy to 125mcg alt 137mcg qod, then went back to 125mcg daily. In the first trimester, TSH did return at TSH 2.98 in May 2024 and dose was subsequently adjusted to 150mcg, and she currently continues on this dose.   Patient had labs on 09/27/2024 with TSH ay 0.53.   She has been compliant in taking the LT4 daily, away from food or any medication that may inhibit absorption. She has tolerated this medication well. Without any apparent adverse effects. She denies any temperature intolerance, significant weight changes, or severe fatigue. She in addition denies any palpitations, tremors, anxiousness, change in bowel habits or significant change in moods.  Patient did have hx of Gestational diabetes controlled with diet. States she recently failed 1hr glucose challenge test, then passed 3hr glucose challenge test. But now pending results of repeat 1hr glucose challenge test.   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   ________________________________________________________________________________________________________  Additional medical history includes vit D deficiency; taking vit D3 1,000 IU daily, prenatal vitamin that includes D3 1,000 IU, probiotic, and Brazil nut, calcium  Too with psoriasis- off ketoconazole, is using topical HC as needed. .  OBGYN: Dr. Warren

## 2024-10-06 NOTE — HISTORY OF PRESENT ILLNESS
[FreeTextEntry1] : Ms. ANTON RONDON is a 34-year-old female who returns today in follow up with regard to a history of hypothyroidism + Underlying Hashimoto's.   First pregnancy: Delivered on 05/05/2023, daughter born at 7lbs 15 oz. During this pregnancy dose was up to 137 mcg daily.   She is now pregnant with second baby. She is currently 29 weeks gestational with estimated due date of December 16, 2024. She will deliver at [facility].   Dose was adjusted from 125mcg pre-pregnancy to 125mcg alt 137mcg qod, then went back to 125mcg daily. In the first trimester, TSH did return at TSH 2.98 in May 2024 and dose was subsequently adjusted to 150mcg, and she currently continues on this dose.   Patient had labs on 09/27/2024 with TSH ay 0.53.   She has been compliant in taking the LT4 daily, away from food or any medication that may inhibit absorption. She has tolerated this medication well. Without any apparent adverse effects. She denies any temperature intolerance, significant weight changes, or severe fatigue. She in addition denies any palpitations, tremors, anxiousness, change in bowel habits or significant change in moods.  Patient did have hx of Gestational diabetes controlled with diet. States she recently failed 1hr glucose challenge test, then passed 3hr glucose challenge test. But now pending results of repeat 1hr glucose challenge test.   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   ________________________________________________________________________________________________________  Additional medical history includes vit D deficiency; taking vit D3 1,000 IU daily, prenatal vitamin that includes D3 1,000 IU, probiotic, and Brazil nut, calcium  Too with psoriasis- off ketoconazole, is using topical HC as needed. .  OBGYN: Dr. Warren

## 2024-10-24 ENCOUNTER — RX RENEWAL (OUTPATIENT)
Age: 34
End: 2024-10-24

## 2024-12-13 ENCOUNTER — TRANSCRIPTION ENCOUNTER (OUTPATIENT)
Age: 34
End: 2024-12-13

## 2024-12-13 ENCOUNTER — INPATIENT (INPATIENT)
Facility: HOSPITAL | Age: 34
LOS: 1 days | Discharge: HOME | DRG: 951 | End: 2024-12-15
Attending: OBSTETRICS & GYNECOLOGY | Admitting: OBSTETRICS & GYNECOLOGY
Payer: COMMERCIAL

## 2024-12-13 VITALS — SYSTOLIC BLOOD PRESSURE: 128 MMHG | DIASTOLIC BLOOD PRESSURE: 75 MMHG | HEART RATE: 78 BPM

## 2024-12-13 DIAGNOSIS — Z34.80 ENCOUNTER FOR SUPERVISION OF OTHER NORMAL PREGNANCY, UNSPECIFIED TRIMESTER: ICD-10-CM

## 2024-12-13 DIAGNOSIS — Z3A.39 39 WEEKS GESTATION OF PREGNANCY: ICD-10-CM

## 2024-12-13 DIAGNOSIS — O26.899 OTHER SPECIFIED PREGNANCY RELATED CONDITIONS, UNSPECIFIED TRIMESTER: ICD-10-CM

## 2024-12-13 LAB
BASOPHILS # BLD AUTO: 0.04 K/UL — SIGNIFICANT CHANGE UP (ref 0–0.2)
BASOPHILS NFR BLD AUTO: 0.4 % — SIGNIFICANT CHANGE UP (ref 0–2)
EOSINOPHIL # BLD AUTO: 0.03 K/UL — SIGNIFICANT CHANGE UP (ref 0–0.5)
EOSINOPHIL NFR BLD AUTO: 0.3 % — SIGNIFICANT CHANGE UP (ref 0–6)
HCT VFR BLD CALC: 39.6 % — SIGNIFICANT CHANGE UP (ref 34.5–45)
HGB BLD-MCNC: 13.1 G/DL — SIGNIFICANT CHANGE UP (ref 11.5–15.5)
IMM GRANULOCYTES NFR BLD AUTO: 0.9 % — SIGNIFICANT CHANGE UP (ref 0–0.9)
LYMPHOCYTES # BLD AUTO: 1.6 K/UL — SIGNIFICANT CHANGE UP (ref 1–3.3)
LYMPHOCYTES # BLD AUTO: 14.5 % — SIGNIFICANT CHANGE UP (ref 13–44)
MCHC RBC-ENTMCNC: 29.1 PG — SIGNIFICANT CHANGE UP (ref 27–34)
MCHC RBC-ENTMCNC: 33.1 G/DL — SIGNIFICANT CHANGE UP (ref 32–36)
MCV RBC AUTO: 88 FL — SIGNIFICANT CHANGE UP (ref 80–100)
MONOCYTES # BLD AUTO: 0.8 K/UL — SIGNIFICANT CHANGE UP (ref 0–0.9)
MONOCYTES NFR BLD AUTO: 7.2 % — SIGNIFICANT CHANGE UP (ref 2–14)
NEUTROPHILS # BLD AUTO: 8.47 K/UL — HIGH (ref 1.8–7.4)
NEUTROPHILS NFR BLD AUTO: 76.7 % — SIGNIFICANT CHANGE UP (ref 43–77)
NRBC # BLD: 0 /100 WBCS — SIGNIFICANT CHANGE UP (ref 0–0)
PLATELET # BLD AUTO: 198 K/UL — SIGNIFICANT CHANGE UP (ref 150–400)
RBC # BLD: 4.5 M/UL — SIGNIFICANT CHANGE UP (ref 3.8–5.2)
RBC # FLD: 13.3 % — SIGNIFICANT CHANGE UP (ref 10.3–14.5)
T PALLIDUM AB TITR SER: NEGATIVE — SIGNIFICANT CHANGE UP
WBC # BLD: 11.04 K/UL — HIGH (ref 3.8–10.5)
WBC # FLD AUTO: 11.04 K/UL — HIGH (ref 3.8–10.5)

## 2024-12-13 DEVICE — SURGICEL POWDER 3 GRAMS: Type: IMPLANTABLE DEVICE | Status: FUNCTIONAL

## 2024-12-13 RX ORDER — 0.9 % SODIUM CHLORIDE 0.9 %
1000 INTRAVENOUS SOLUTION INTRAVENOUS
Refills: 0 | Status: DISCONTINUED | OUTPATIENT
Start: 2024-12-13 | End: 2024-12-15

## 2024-12-13 RX ORDER — 0.9 % SODIUM CHLORIDE 0.9 %
1000 INTRAVENOUS SOLUTION INTRAVENOUS
Refills: 0 | Status: DISCONTINUED | OUTPATIENT
Start: 2024-12-13 | End: 2024-12-13

## 2024-12-13 RX ORDER — KETOROLAC TROMETHAMINE 30 MG/ML
30 INJECTION INTRAMUSCULAR; INTRAVENOUS EVERY 6 HOURS
Refills: 0 | Status: COMPLETED | OUTPATIENT
Start: 2024-12-13 | End: 2024-12-14

## 2024-12-13 RX ORDER — HYDROMORPHONE HYDROCHLORIDE 2 MG/1
30 TABLET ORAL
Refills: 0 | Status: DISCONTINUED | OUTPATIENT
Start: 2024-12-13 | End: 2024-12-14

## 2024-12-13 RX ORDER — ONDANSETRON HYDROCHLORIDE 4 MG/1
4 TABLET, FILM COATED ORAL EVERY 6 HOURS
Refills: 0 | Status: DISCONTINUED | OUTPATIENT
Start: 2024-12-13 | End: 2024-12-15

## 2024-12-13 RX ORDER — TETANUS TOXOID, REDUCED DIPHTHERIA TOXOID AND ACELLULAR PERTUSSIS VACCINE, ADSORBED 5; 2.5; 8; 8; 2.5 [IU]/.5ML; [IU]/.5ML; UG/.5ML; UG/.5ML; UG/.5ML
0.5 SUSPENSION INTRAMUSCULAR ONCE
Refills: 0 | Status: DISCONTINUED | OUTPATIENT
Start: 2024-12-14 | End: 2024-12-15

## 2024-12-13 RX ORDER — OXYCODONE HYDROCHLORIDE 30 MG/1
5 TABLET ORAL
Refills: 0 | Status: DISCONTINUED | OUTPATIENT
Start: 2024-12-14 | End: 2024-12-15

## 2024-12-13 RX ORDER — NALOXONE HCL 0.4 MG/ML
0.1 AMPUL (ML) INJECTION
Refills: 0 | Status: DISCONTINUED | OUTPATIENT
Start: 2024-12-13 | End: 2024-12-15

## 2024-12-13 RX ORDER — ONDANSETRON HYDROCHLORIDE 4 MG/1
4 TABLET, FILM COATED ORAL ONCE
Refills: 0 | Status: DISCONTINUED | OUTPATIENT
Start: 2024-12-13 | End: 2024-12-15

## 2024-12-13 RX ORDER — LEVOTHYROXINE SODIUM 150 MCG
150 TABLET ORAL DAILY
Refills: 0 | Status: DISCONTINUED | OUTPATIENT
Start: 2024-12-13 | End: 2024-12-14

## 2024-12-13 RX ORDER — CEFAZOLIN SODIUM 10 G
2000 VIAL (EA) INJECTION EVERY 8 HOURS
Refills: 0 | Status: COMPLETED | OUTPATIENT
Start: 2024-12-13 | End: 2024-12-14

## 2024-12-13 RX ORDER — CHLORHEXIDINE GLUCONATE 1.2 MG/ML
1 RINSE ORAL DAILY
Refills: 0 | Status: DISCONTINUED | OUTPATIENT
Start: 2024-12-13 | End: 2024-12-13

## 2024-12-13 RX ORDER — CEFAZOLIN SODIUM 10 G
2000 VIAL (EA) INJECTION EVERY 8 HOURS
Refills: 0 | Status: DISCONTINUED | OUTPATIENT
Start: 2024-12-13 | End: 2024-12-13

## 2024-12-13 RX ORDER — DIPHENHYDRAMINE HCL 25 MG
25 CAPSULE ORAL EVERY 6 HOURS
Refills: 0 | Status: DISCONTINUED | OUTPATIENT
Start: 2024-12-14 | End: 2024-12-15

## 2024-12-13 RX ORDER — HEPARIN SODIUM,PORCINE 1000/ML
5000 VIAL (ML) INJECTION EVERY 12 HOURS
Refills: 0 | Status: DISCONTINUED | OUTPATIENT
Start: 2024-12-14 | End: 2024-12-15

## 2024-12-13 RX ORDER — LANOLIN 72 %
1 OINTMENT (GRAM) TOPICAL EVERY 6 HOURS
Refills: 0 | Status: DISCONTINUED | OUTPATIENT
Start: 2024-12-14 | End: 2024-12-15

## 2024-12-13 RX ORDER — SIMETHICONE 125 MG
80 CAPSULE ORAL EVERY 4 HOURS
Refills: 0 | Status: DISCONTINUED | OUTPATIENT
Start: 2024-12-14 | End: 2024-12-15

## 2024-12-13 RX ORDER — HYDROMORPHONE HYDROCHLORIDE 2 MG/1
0.5 TABLET ORAL
Refills: 0 | Status: DISCONTINUED | OUTPATIENT
Start: 2024-12-13 | End: 2024-12-14

## 2024-12-13 RX ORDER — TRISODIUM CITRATE DIHYDRATE AND CITRIC ACID MONOHYDRATE 500; 334 MG/5ML; MG/5ML
15 SOLUTION ORAL EVERY 6 HOURS
Refills: 0 | Status: DISCONTINUED | OUTPATIENT
Start: 2024-12-13 | End: 2024-12-13

## 2024-12-13 RX ORDER — ACETAMINOPHEN 500MG 500 MG/1
975 TABLET, COATED ORAL
Refills: 0 | Status: DISCONTINUED | OUTPATIENT
Start: 2024-12-13 | End: 2024-12-15

## 2024-12-13 RX ORDER — IBUPROFEN 200 MG
600 TABLET ORAL EVERY 6 HOURS
Refills: 0 | Status: COMPLETED | OUTPATIENT
Start: 2024-12-14 | End: 2025-11-12

## 2024-12-13 RX ORDER — HYDROMORPHONE HYDROCHLORIDE 2 MG/1
30 TABLET ORAL
Refills: 0 | Status: DISCONTINUED | OUTPATIENT
Start: 2024-12-13 | End: 2024-12-13

## 2024-12-13 RX ORDER — OXYCODONE HYDROCHLORIDE 30 MG/1
5 TABLET ORAL ONCE
Refills: 0 | Status: DISCONTINUED | OUTPATIENT
Start: 2024-12-14 | End: 2024-12-15

## 2024-12-13 RX ADMIN — ACETAMINOPHEN 500MG 975 MILLIGRAM(S): 500 TABLET, COATED ORAL at 21:47

## 2024-12-13 RX ADMIN — ACETAMINOPHEN 500MG 975 MILLIGRAM(S): 500 TABLET, COATED ORAL at 22:20

## 2024-12-13 RX ADMIN — ONDANSETRON HYDROCHLORIDE 4 MILLIGRAM(S): 4 TABLET, FILM COATED ORAL at 19:14

## 2024-12-13 RX ADMIN — Medication 167 MILLIUNIT(S)/MIN: at 15:57

## 2024-12-13 RX ADMIN — HYDROMORPHONE HYDROCHLORIDE 30 MILLILITER(S): 2 TABLET ORAL at 16:31

## 2024-12-13 NOTE — DISCHARGE NOTE OB - FINANCIAL ASSISTANCE
[facility] provides services at a reduced cost to those who are determined to be eligible through [facility]’s financial assistance program. Information regarding [facility]’s financial assistance program can be found by going to [email or web address] or by calling [phone].

## 2024-12-13 NOTE — OB RN DELIVERY SUMMARY - NS_LABORCHARACTER_OBGYN_ALL_OB
[facility]: Integrated Behavioral Health  June 14, 2022      Behavioral Health Clinician Progress Note    Patient Name: Frandy Workman           Service Type: Video visit      Service Location:  Video visit      Session Start Time: 2:02  Session End Time:  2:33      Session Length: 16 - 37      Attendees: Patient    Visit Activities (Refresh list every visit): [facility] Only     Service Modality:  Video Visit:      Provider verified identity through the following two step process.  Patient provided:  Patient photo and Patient is known previously to provider    Telemedicine Visit: The patient's condition can be safely assessed and treated via synchronous audio and visual telemedicine encounter.      Reason for Telemedicine Visit: Patient has requested telehealth visit and Services only offered telehealth    Originating Site (Patient Location): Patient's home    Distant Site (Provider Location): Provider Remote Setting- Home Office    Consent:  The patient/guardian has verbally consented to: the potential risks and benefits of telemedicine (video visit) versus in person care; bill my insurance or make self-payment for services provided; and responsibility for payment of non-covered services.     Patient would like the video invitation sent by:  Send to e-mail at: [email or web address]    Mode of Communication:  Video Conference via [facility]    As the provider I attest to compliance with applicable laws and regulations related to telemedicine.      Diagnostic Assessment Date:  12/03/2020  Treatment Plan Review Date:  9/14/2022  See Flowsheets for today's PHQ-9 and LIZZETTE-7 results  Previous PHQ-9:   PHQ-9 SCORE 3/21/2022 5/17/2022 5/23/2022   PHQ-9 Total Score MyChart 6 (Mild depression) 5 (Mild depression) 2 (Minimal depression)   PHQ-9 Total Score 6 5 2     Previous LIZZETTE-7:   LIZZETTE-7 SCORE 12/29/2020 4/7/2021 9/30/2021   Total Score 8 (mild anxiety) 9 (mild anxiety) -   Total Score 8 9 10        Extended Session (60+ minutes): No  Interactive Complexity: No  Crisis: No    Treatment Objective(s) Addressed in This Session:  Target Behavior(s): disease management/lifestyle changes  related to adaptive approaches to managing anxious distress and mood difficulties    Increase interest and engagement in doing enjoyable/mastery activities    Current Stressors / Issues:  [facility] met with Miller today for a follow-up. Miller begins our session with providing a few updates about his health and related energy levels. Notes he is making progress, though struggles with low energy and activation around a few things he wants to engage in, like going to [employer]. He notes that he finds going to [employer] in particular very important to him, though notes he struggles with getting there on Sundays, finding the motivation to go. As he discussed barriers to this, we identified how simplifying the task of going to [employer] might benefit him - instead of going, meeting new people, introducing himself to the , going to confession, and finding the right outfit, we discussed simplifying this goal to only getting himself to the [employer] door for now. We also explored additional methods of building adaptive habits such as incorporating accountability (e.g. telling his friend Art he plans to go to [employer] on a certain Sunday), loss prevention ideas (e.g. having to give Art 10 dollars if he doesn't go), and positive reinforcement ideas (e.g rewarding himself with something small if he goes, like a [employer] coffee).     Answers for HPI/ROS submitted by the patient on 3/21/2022  If you checked off any problems, how difficult have these problems made it for you to do your work, take care of things at home, or get along with other people?: Not difficult at all  PHQ9 TOTAL SCORE: 6      Progress on Treatment Objective(s) / Homework:  Stable - MAINTENANCE (Working to maintain change, with risk of relapse); Intervened by continuing to positively  reinforce healthy behavior choice       Solution-Focused Therapy    Explore patterns in patient's relationships and discuss options for new behaviors.    Explore patterns in patient's actions and choices and discuss options for new behaviors.    Supportive Psychotherapy      Answers for HPI/ROS submitted by the patient on 2/8/2022  If you checked off any problems, how difficult have these problems made it for you to do your work, take care of things at home, or get along with other people?: Somewhat difficult  PHQ9 TOTAL SCORE: 4      Answers for HPI/ROS submitted by the patient on 4/7/2021   LIZZETTE 7 TOTAL SCORE: 9  If you checked off any problems, how difficult have these problems made it for you to do your work, take care of things at home, or get along with other people?: Very difficult  PHQ9 TOTAL SCORE: 7*    *No SI    Answers for HPI/ROS submitted by the patient on 10/13/2020   If you checked off any problems, how difficult have these problems made it for you to do your work, take care of things at home, or get along with other people?: Somewhat difficult  PHQ9 TOTAL SCORE: 8*  LIZZETTE 7 TOTAL SCORE: 6      *No SI     Answers for HPI/ROS submitted by the patient on 12/29/2020   If you checked off any problems, how difficult have these problems made it for you to do your work, take care of things at home, or get along with other people?: Somewhat difficult  PHQ9 TOTAL SCORE: 5*  LIZZETTE 7 TOTAL SCORE: 8    *No SI       Care Plan review completed: yes    Medication Review:  No changes to current psychiatric medication(s)     Current Outpatient Medications   Medication     Acetaminophen (TYLENOL) 325 MG CAPS     ARIPiprazole (ABILIFY) 5 MG tablet     aspirin (SM ASPIRIN ADULT LOW STRENGTH) 81 MG EC tablet     BD VIKTORIA U/F 32G X 4 MM insulin pen needle     ciclopirox (LOPROX) 0.77 % cream     Continuous Blood Gluc  (FREESTYLE CLAUDIA 14 DAY READER) CECILIO     Continuous Blood Gluc Sensor (FREESTYLE CLAUDIA 2 SENSOR) [facility] [place]      cyanocobalamin (VITAMIN B-12) 1000 MCG tablet     empagliflozin (JARDIANCE) 10 MG TABS tablet     insulin aspart (NOVOLOG PEN) 100 UNIT/ML pen     insulin degludec (TRESIBA FLEXTOUCH) 100 UNIT/ML pen     lamiVUDine (EPIVIR) 100 MG tablet     lisinopril (ZESTRIL) 5 MG tablet     methocarbamol (ROBAXIN) 750 MG tablet     metoprolol succinate ER (TOPROL-XL) 25 MG 24 hr tablet     Multiple Vitamin (TAB-A-GLADIS) TABS     mycophenolate (GENERIC EQUIVALENT) 250 MG capsule     order for DME     pantoprazole (PROTONIX) 40 MG EC tablet     polyethylene glycol (MIRALAX) 17 g packet     predniSONE (DELTASONE) 5 MG tablet     rosuvastatin (CRESTOR) 5 MG tablet     senna-docusate (SENOKOT-S/PERICOLACE) 8.6-50 MG tablet     tamsulosin (FLOMAX) 0.4 MG capsule     vitamin D3 (VITAMIN D3) 50 mcg (2000 units) tablet     Current Facility-Administered Medications   Medication     aflibercept (EYLEA) injection prefilled syringe 2 mg     aflibercept (EYLEA) injection prefilled syringe 2 mg         Medication Compliance:  Yes    Changes in Health Issues:   None reported    Chemical Use Review:   Substance Use: Chemical use reviewed, no active concerns identified      Tobacco Use: No current tobacco use.         Assessment: Current Emotional / Mental Status (status of significant symptoms):  Risk status (Self / Other harm or suicidal ideation)  Patient denies a history of suicidal ideation, suicide attempts, self-injurious behavior, homicidal ideation, homicidal behavior and and other safety concerns     Patient denies current fears or concerns for personal safety.  Patient denies current or recent suicidal ideation or behaviors.  Patient denies current or recent homicidal ideation or behaviors.  Patient denies current or recent self injurious behavior or ideation.  Patient denies other safety concerns.     A safety and risk management plan has not been developed at this time, however patient was encouraged to call [place] [phone]  should there be a change in any of these risk factors.    [place] Suicide Severity Rating Scale (Short Version)  [place] Suicide Severity Rating (Short Version) 11/10/2019 12/2/2019 12/10/2019 6/11/2020 7/1/2020 7/12/2021 1/10/2022   Over the past 2 weeks have you felt down, depressed, or hopeless? no yes yes no no no no   Over the past 2 weeks have you had thoughts of killing yourself? no yes no no no no no   Comments - lots of stressors, has thought about not taking meds - - - - -   Have you ever attempted to kill yourself? no no no no no no no   Q1 Wished to be Dead (Past Month) - other (see comments) no - - - -   Comments - why did i do this surgery - - - - -   Q2 Suicidal Thoughts (Past Month) - no no - - - -   Comments - wishes he wouldn't have gone through surgery - - - - -   Q3 Suicidal Thought Method - no no - - - -   Q4 Suicidal Intent without Specific Plan - no no - - - -   Q5 Suicide Intent with Specific Plan - no no - - - -   Q6 Suicide Behavior (Lifetime) - no no - - - -         Appearance:   Appropriate   Eye Contact:   Good   Psychomotor Behavior: Restless   Attitude:   Cooperative  Interested Friendly Pleasant  Orientation:   All  Speech   Rate / Production: Normal/ Responsive   Volume:  Normal   Mood:    Depressed ; improving  Affect:    Appropriate    Thought Content:  Clear   Thought Form:  Goal Directed  Logical   Insight:    Good     Diagnoses:  1. Bipolar 1 disorder, depressed, mild (H)    2. Generalized anxiety disorder          Collateral Reports Completed:  Not Applicable    Plan: (Homework, other):  Continue with this writer for individual psychotherapy in three weeks. He will continue to work on managing depression and anxiety through achievable behavioral activation ideas presented today. Also encouraged him to start using a journal at night for sleep and managing depressed mood.  No CD issues.     Joseph Murillo, LP  Ramona 15,  2022            ______________________________________________________________________                                            Individual Treatment Plan    Patient's Name: Frandy Workman  YOB: 1965    Date of Creation: 3/1/2022  Date Treatment Plan Last Reviewed/Revised: 6/14/2022  DSM5 Diagnoses: 296.51 Bipolar I Disorder Current or Most Recent Episode Depressed, Mild or 300.02 (F41.1) Generalized Anxiety Disorder  Psychosocial / Contextual Factors: Post Solid Organ Tx  PROMIS (reviewed every 90 days): 4    Referral / Collaboration:  Referral to another professional/service is not indicated at this time..    Anticipated number of session for this episode of care: 10+  Anticipation frequency of session: Every other week  Anticipated Duration of each session: 38-52 minutes  Treatment plan will be reviewed in 90 days or when goals have been changed.       MeasurableTreatment Goal(s) related to diagnosis / functional impairment(s)  Goal 1: Patient will experience a reduction in depressive symptoms along with a corresponding increase in positive emotion and life satisfaction.      Objective #A (Patient Action)    Patient will Increase interest, engagement, and pleasure in doing things.  Status: Continued - Date(s): 6/14/2022    Intervention(s)  Therapist will help patient identify pleasant and mastery oriented events that elicit positive, relaxed mood.    Objective #B  Patient will Decrease frequency and intensity of feeling down, depressed, hopeless.  Status: Continued - Date(s): 6/14/2022    Intervention(s)  Therapist will introduce patient to cognitive-behavioral and acceptance and commitment therapy topics aimed to help reduce depression and anxiety    Objective #C  Patient will Identify negative self-talk and behaviors: challenge core beliefs, myths, and actions  Improve concentration, focus, and mindfulness in daily activities .  Status: Continued - Date(s):  "6/14/2022    Intervention(s)  Therapist will help patient identify and manage negative self-talk and automatic thoughts; introduce patient to cognitive distortions; help patient develop cognitive diffusion techniques      Goal 2: Patient will experience a reduction in anxious symptoms, along with a corresponding increase in relaxed emotional states and life satisfaction.      Objective #A (Patient Action)  Patient will use cognitive-behavioral and thought diffusion strategies identified in therapy to challenge anxious thoughts.    Status: Continued - Date(s): 6/14/2022    Intervention(s)  Therapist will utilize CBT and ACT ideas to help patient challenge anxious thoughts and reduce intensity/duration of anxious distress    Objective #B  Patient will use \"worry time\" each day for 15 minutes of scheduled worry and then defer obsessive or anxious thinking until the next structured worry time.    Status: Continued - Date(s): 6/14/2022    Intervention(s)  Therapist will teach patient how to effectively utilize worry time and/or thought logs/journals each day and incorporate more relaxation behaviors into their routine.    Objective #C  Patient will identify the stressors which contribute to feelings of anxiety  Patient will increase engagement in adaptive coping skills and recreational activities, such as exercise and healthy socialization, to manage distress.  Status: Continued - Date(s): 6/14/2022    Intervention(s)  Therapist will help patient identify triggers/situational factors that contribute to anxiety and behavioral skills aimed to manage anxious distress.      Goal 3: Patiient will work toward adaptively managing bipolar-related depression and manic episodes      Objective #A (Patient Action)    Status: Continued - Date(s): 6/14/2022    Patient will identify 2-3 signs or signals of emerging mood instability.    Intervention(s)  Therapist will provide educational materials on bipolar disorder and help " identifying triggers for mood instability.    Objective #B  Patient will identify 2-3 strategies for more effectively managing Bipolar Disorder.    Status: Continued - Date(s): 6/14/2022    Intervention(s)  Therapist will teach emotional recognition/identification. Skills aimed to effectively manage bipolar disorder.      Other Possible Therapeutic Intervention(s):    Psycho-education regarding mental health diagnoses and treatment options    Supportive Therapy    Provide affirmations, reflections, and establish working rapport    Emphasize and reflect on strength of therapeutic relationship    Skills training    Explore skills useful to client in current situation.    Skills include assertiveness, communication, conflict management, problem-solving, relaxation, etc.    Solution-Focused Therapy    Explore patterns in patient's relationships and discuss options for new behaviors.    Explore patterns in patient's actions and choices and discuss options for new behaviors.    Cognitive-behavioral Therapy    Discuss common cognitive distortions, identify them in patient's life.    Explore ways to challenge, replace, and act against these cognitions.    Acceptance and Commitment Therapy    Explore and identify important values in patient's life.    Discuss ways to commit to behavioral activation around these values.    Psychodynamic psychotherapy    Discuss patient's emotional dynamics and issues and how they impact behaviors.    Explore patient's history of relationships and how they impact present behaviors.    Explore how to work with and make changes in these schemas and patterns.    Narrative Therapy    Explore the patient's story of his/her life from his/her perspective.    Explore alternate ways of understanding their experience, identifying exceptions, developing new themes.    Interpersonal Psychotherapy    Explore patterns in relationships that are effective or ineffective at helping patient reach their goals,  find satisfying experience.    Discuss new patterns or behaviors to engage in for improved social functioning.    Behavioral Activation    Discuss steps patient can take to become more involved in meaningful activity.    Identify barriers to these activities and explore possible solutions.    Mindfulness-Based Strategies    Discuss skills based on development and application of mindfulness.    Skills drawn from compassion-focused therapy, dialectical behavior therapy, mindfulness-based stress reduction, mindfulness-based cognitive therapy, etc.      Patient has reviewed and agreed to the above plan.      Joseph Murillo Psy.D, LP   Behavioral Health Clinician   -[facility]     6/14/2022     Induction of labor-AROM/Internal electronic FM/External electronic FM/Fetal intolerance

## 2024-12-13 NOTE — DISCHARGE NOTE OB - CARE PLAN
1 Principal Discharge DX:	S/P  section  Assessment and plan of treatment:	routine post partum care;  regular diet;  limited physical and sexual activities for 5-6 weeks;  to office in 2 weeks

## 2024-12-13 NOTE — OB PROVIDER H&P - HISTORY OF PRESENT ILLNESS
OB PA Admission H&P    34yoF  @39.4 weeks gestation (JUANCHO 24) presents for ROL. Pt c/o painful contractions occurring regularly every 3-4 minutes, denies any leakage of fluid or vaginal bleeding. Endorses +FM. PNC has been uncomplicated. GBS negative. Pt denies any other concerns. Pt's  and  at bedside.     – PNC: AMA. GBS negative. EFW 3600g. Gender unknown.    – OBHx: ()  FT 7#15 c/b GDMA1                 () chemical pregnancy   – GynHx: denies h/o fibroids, ovarian cysts, abnl pap smears, STDs  – PMH: h/o hypothyroidism; denies h/o HTN, DM, asthma, bleeding disorders, h/o blood transfusions   – PSH: denies  – Psych: h/o PPD   – Social: denies alcohol/tobacco/drug use in pregnancy   – Meds: PNV, Levothyroxine 150mcg    – Allergies: NKDA  – Will accept blood transfusions: Yes    Vital Signs Last 24 Hrs  T(C): 36.6 (13 Dec 2024 14:19), Max: 36.6 (13 Dec 2024 13:14)  T(F): 97.9 (13 Dec 2024 13:41), Max: 97.9 (13 Dec 2024 13:14)  HR: 117 (13 Dec 2024 14:20) (71 - 117)  BP: 141/82 (13 Dec 2024 14:19) (125/75 - 141/82)  RR: 18 (13 Dec 2024 14:19) (18 - 18)  SpO2: 100% (13 Dec 2024 14:20) (84% - 100%)    Gen: NAD  CV: RRR  Lungs: CTA b/l   Abd: gravid, non-tender  Ext: BLE non-edematous, no calf tenderness b/l     – VE: 7/90/-3  – FHT: baseline 130, mod variability, +accels, -decels  – [place]: q3-4 mins   – [place]: vertex

## 2024-12-13 NOTE — PRE-ANESTHESIA EVALUATION ADULT - NSANTHPEFT_GEN_ALL_CORE
General: NAD  Cardiovascular: RRR  Respiratory: Breathing comfortably on room air Cardiovascular: RRR  Respiratory: Breathing comfortably on room air

## 2024-12-13 NOTE — OB RN PATIENT PROFILE - URINARY CATHETER
Condition:: Dermatofibroma
Please Describe Your Condition:: on the left lower leg. Patient states that it was discussed at last visit that it could be removed if bacame irritated. Patient states she would like to have the area removed.
no

## 2024-12-13 NOTE — OB PROVIDER DELIVERY SUMMARY - NSPROVIDERDELIVERYNOTE_OBGYN_ALL_OB_FT
Emergent pLTCS at 39w4d for terminal bradycardia, uncomplicated   Viable male infant, apgars 8/9, weight 3880g, vertex presentation  Hysterotomy closed in 1 layer using Vicryl. Additional figure of 8 stitch placed at middle of hysterotomy for hemostasis  Surgicel placed over hysterotomy   Grossly normal uterus, tubes, and ovaries  Abdomen closed in standard fashion  Vagina inspected at conclusion of case and no bleeding lacerations noted  Pt and infant to recovery in stable condition  QBL: 1168   IVF: 2000    UOP: 45    Dictation #[id number]    Delivery with Dr. Kelvin Wynn, PGY-3

## 2024-12-13 NOTE — OB NEONATOLOGY/PEDIATRICIAN DELIVERY SUMMARY - BABY A: APGAR 1 MIN SCORE, DELIVERY
LOS: 4 days   Patient Care Team:  Omari Ovalle MD as PCP - General (Family Medicine)  Omari Ovalle MD as PCP - Claims Attributed    Chief Complaint:     F/u chf, diastolic dysfunction.     Interval History:     He is very short winded.  He has abdominal discomfort.  He denies chest pain.  He is not having nausea.    Objective   Vital Signs  Temp:  [97 °F (36.1 °C)-99 °F (37.2 °C)] 97 °F (36.1 °C)  Heart Rate:  [] 70  Resp:  [16-18] 18  BP: (100-133)/(56-63) 133/56    Intake/Output Summary (Last 24 hours) at 6/14/2019 0756  Last data filed at 6/14/2019 0300  Gross per 24 hour   Intake 1116.67 ml   Output 500 ml   Net 616.67 ml       Comfortable NAD  Neck supple, no JVD or thyromegaly appreciated  S1/S2 irregular, no tachycardia, no m/r/g  Lungs CTA B, increased effort, tachypnea  Abdomen S/NT/distended (+) BS, no HSM appreciated  Extremities warm, no clubbing, cyanosis, 1+ edema  No visible or palpable skin lesions  A/Ox4, mood and affect appropriate    Results Review:      Results from last 7 days   Lab Units 06/14/19  0432 06/13/19  1810 06/13/19  1053   SODIUM mmol/L 128* 128* 130*   POTASSIUM mmol/L 3.4* 3.8 3.8   CHLORIDE mmol/L 92* 90* 91*   CO2 mmol/L 20.9* 22.2 21.5*   BUN mg/dL 73* 69* 69*   CREATININE mg/dL 3.65* 3.38* 3.33*   GLUCOSE mg/dL 227* 298* 292*   CALCIUM mg/dL 8.3* 8.0* 8.2*     Results from last 7 days   Lab Units 06/10/19  1301   CK TOTAL U/L 140   TROPONIN T ng/mL 0.026     Results from last 7 days   Lab Units 06/14/19  0432 06/13/19  0349 06/12/19  0612   WBC 10*3/mm3 10.09 8.53 10.37   HEMOGLOBIN g/dL 7.7* 8.3* 9.1*   HEMATOCRIT % 22.9* 25.1* 27.5*   PLATELETS 10*3/mm3 94* 89* 98*     Results from last 7 days   Lab Units 06/10/19  1301   INR  1.16*   APTT seconds 32.3         Results from last 7 days   Lab Units 06/14/19  0432   MAGNESIUM mg/dL 2.1           I reviewed the patient's new clinical results.  I personally viewed and interpreted the patient's EKG/Telemetry data      8    Medication Review:     acetaminophen 1,000 mg Oral 4x Daily - RT   allopurinol 100 mg Oral Daily   atorvastatin 10 mg Oral Nightly   bacitracin  Topical Q12H   diltiaZEM  mg Oral Q24H   docusate sodium 100 mg Oral BID   enoxaparin 30 mg Subcutaneous Q24H   famotidine 20 mg Oral Daily   insulin aspart 0-14 Units Subcutaneous 4x Daily AC & at Bedtime   insulin detemir 10 Units Subcutaneous QAM   ipratropium-albuterol 3 mL Nebulization Q6H While Awake - RT   metoprolol succinate  mg Oral Daily   multivitamin with minerals 1 tablet Oral Daily   polyethylene glycol 17 g Oral Daily   sodium chloride 3 mL Intravenous Q12H   tamsulosin 0.8 mg Oral Nightly            Assessment/Plan       Closed displaced intertrochanteric fracture of left femur (CMS/HCC)    Chronic diastolic CHF (congestive heart failure) (CMS/HCC)    Hypertension    Chronic atrial fibrillation (CMS/HCC)    Chronic respiratory failure (CMS/HCC)    Pulmonary hypertension (CMS/HCC)    Sick sinus syndrome (CMS/HCC)    Intertrochanteric fracture of left hip (CMS/HCC)    1.  PO day #3 left intertrochanteric fracture left femur - per Dr. Gastelum     2.  Chronic diastolic CHF - NYHA III.  No distress.  Trace pedal edema. Lisinopril stopped.  Diuretics on hold due to renal failure.      3.  Chronic atrial fibrillation - continue prophylactic lovenox.  No on OAC for his AF secondary to falls and personal preference.  He is rate controlled, on BB and diltiazem.     4.  HTN -   BP stable.     5.  PHTN - recent echo showed normal LVEF EF 65%, mild LVH, grade III diastolic dysfunction, and moderate PHTN/RVSP 50-60mm Hg.  Followed by Dr. Paul. No longer on sildenafil.  Pulmonary following.     6.  SSS/pacemaker     7.  Chronic respiratory failure - Pulmonary following    8. Renal failure, worsening.     9. Anemia, worsening.     He overall is worsening and really needs dialysis likely at this point.  I am not sure that we can continue to treat him out  here effectively.  Would recommend that he get transferred into [facility] for further management.    Reconsult in [place].      Sara Kaur MD  06/14/19  7:56 AM

## 2024-12-13 NOTE — OB PROVIDER DELIVERY SUMMARY - NSLOWPPHRISK_OBGYN_A_OB
No previous uterine incision/[place] Pregnancy/Less than or equal to 4 previous vaginal births/No known bleeding disorder/No history of postpartum hemorrhage

## 2024-12-13 NOTE — OB NEONATOLOGY/PEDIATRICIAN DELIVERY SUMMARY - NSPEDSNEONOTESA_OBGYN_ALL_OB_FT
Called by OB to attend crash c/s delivery due to fetal bradycardia. Baby is  product of a 39.4 week gestation born to a   34 year old female   Maternal labs include Blood Type O+, HIV - , RPR- , Hep B- GBS - on , rubella immune. Maternal history is significant for  x1  with GDM, heart murmur, hypothyroidism, psoriasis.  Pregnancy was complicated by bradycardia. ROM at 1424 AF clear, approximately 1 hrs.  Resuscitation included: d/w/s/s 30 sec delayed cord clamping performed. Apgars were:8/9. EOS score0.09 Admit to NBN.

## 2024-12-13 NOTE — DISCHARGE NOTE OB - HOSPITAL COURSE
the patient was admitted in labor and had an emergent c/s for fetal distress.  she did well post op the patient was admitted in labor and had an emergent c/s for fetal distress.  she did well post op-She is stable for discharge on POD 2.

## 2024-12-13 NOTE — OB RN INTRAOPERATIVE NOTE - NS_ADDITIONALPROCINFO1_OBGYN_ALL_OB_FT
1431 OB Stat Team Called  QBL [id number]  pt noted to have a rightside periurethral tear per raudel wadsworth  Skin to skin delayed due to maternal clinical indication, pt got general anesthesia

## 2024-12-13 NOTE — DISCHARGE NOTE OB - MEDICATION SUMMARY - MEDICATIONS TO TAKE
I will START or STAY ON the medications listed below when I get home from the hospital:    ibuprofen 600 mg oral tablet  -- 1 tab(s) by mouth every 6 hours  -- Indication: For pain    acetaminophen 325 mg oral tablet  -- 3 tab(s) by mouth every 8 hours as needed for  mild pain  -- Indication: For pain    Prenatal Multivitamins with Folic Acid 1 mg oral tablet  -- 1 tab(s) by mouth once a day  -- Indication: For wellness    ferrous sulfate 325 mg (65 mg elemental iron) oral tablet  -- 1 tab(s) by mouth 3 times a day  -- Indication: For anemia    levothyroxine 112 mcg (0.112 mg) oral tablet  -- 1 tab(s) by mouth once a day  -- Indication: For hypothyroid

## 2024-12-13 NOTE — OB RN INTRAOPERATIVE NOTE - NS_ADDITIONALPERSONNEL_OBGYN_ALL_OB_FT
sarabjit beckman (), raudel wadsworth, jonathan np, indio pa, vito pa, esme np, lex rn, jaskaran case,

## 2024-12-13 NOTE — OB PROVIDER H&P - ASSESSMENT
A/P: 34yoF  @39.4 weeks gestation (JUANCHO 24) admitted for labor. GBS negative. PNC uncomplicated   - Admit to L&D  - Routine Labs. IVF   - EFM/[place]: continuous monitoring   - Expectant management   - GBS negative   - Elevated PPH risk 2/2 AMA  - Anesthesia consult -> requesting epidural for pain mgmt   - Hep C for placenta   - D/w Dr. Kelvin Beyer, PA-C

## 2024-12-13 NOTE — PRE-ANESTHESIA EVALUATION ADULT - NSANTHADDINFOFT_GEN_ALL_CORE
upon arrival to labor room, observed pt screaming, visibly uncooperative/ non-communicative while Dr. Warren attempting to assess pt.   Upon decision by Dr. Warren to emergently deliver pt by C/S, anesthesia consent was obtained from pt.'s , Padmini Petty O.R.  Accompanied pt to O.R., at which time pt. continued to be uncooperative; pt transferred by team to O.R. table; When it became apparent that pt would not cooperate with prep (team unable to insert hernández catheter secondary to pt thrashing), communicated W/ Dr. Warren that pt would be immediately induced with GETA to facilitate prep and delivery.  Refer to anesthesia record for further details.

## 2024-12-13 NOTE — OB RN DELIVERY SUMMARY - NSSELHIDDEN_OBGYN_ALL_OB_FT
[NS_DeliveryAttending1_OBGYN_ALL_OB_FT:[id number]==],[NS_DeliveryAssist1_OBGYN_ALL_OB_FT:[id number]],[NS_DeliveryAssist2_OBGYN_ALL_OB_FT:[id number]],[NS_DeliveryRN_OBGYN_ALL_OB_FT:[id number]==],[NS_CirculateRN2_OBGYN_ALL_OB_FT:[id number]]

## 2024-12-13 NOTE — DISCHARGE NOTE OB - PATIENT PORTAL LINK FT
You can access the FollowMyHealth Patient Portal offered by [facility] by registering at the following website: [email or web address]. By joining [employer]’s FollowMyHealth portal, you will also be able to view your health information using other applications (apps) compatible with our system.

## 2024-12-13 NOTE — OB PROVIDER H&P - NSPPHRISKSTATUS_OBGYN_ALL_OB
Quality 110: Preventive Care And Screening: Influenza Immunization: Influenza Immunization Administered during Influenza season
Detail Level: Detailed
Moderate Risk

## 2024-12-13 NOTE — OB RN DELIVERY SUMMARY - NS_SEPSISRSKCALC_OBGYN_ALL_OB_FT
EOS calculated successfully. EOS Risk Factor: 0.5/1000 live births ([facility] national incidence); GA=39w4d; Temp=97.9; ROM=0.283; GBS='Negative'; Antibiotics='No antibiotics or any antibiotics < 2 hrs prior to birth'

## 2024-12-13 NOTE — OB RN INTRAOPERATIVE NOTE - NSSELHIDDEN_OBGYN_ALL_OB_FT
[NS_DeliveryAttending1_OBGYN_ALL_OB_FT:[id number]==],[NS_DeliveryAssist1_OBGYN_ALL_OB_FT:[id number]],[NS_DeliveryAssist2_OBGYN_ALL_OB_FT:[id number]=],[NS_DeliveryRN_OBGYN_ALL_OB_FT:[id number]==],[NS_CirculateRN2_OBGYN_ALL_OB_FT:[id number]]

## 2024-12-13 NOTE — OB RN TRIAGE NOTE - NSICDXPASTMEDICALHX_GEN_ALL_CORE_FT
PAST MEDICAL HISTORY:  Chemical pregnancy     Heart murmur     Hypothyroidism     Normal vaginal delivery     Psoriasis, unspecified

## 2024-12-13 NOTE — OB PROVIDER H&P - NSLOWPPHRISK_OBGYN_A_OB
[place] Pregnancy/No known bleeding disorder/No history of postpartum hemorrhage/No other PPH risks indicated

## 2024-12-13 NOTE — OB RN PATIENT PROFILE - FALL HARM RISK - UNIVERSAL INTERVENTIONS
Bed in lowest position, wheels locked, appropriate side rails in place/Call bell, personal items and telephone in reach/Instruct patient to call for assistance before getting out of bed or chair/Non-slip footwear when patient is out of bed/[place] to call system/Physically safe environment - no spills, clutter or unnecessary equipment/Purposeful Proactive Rounding/Room/bathroom lighting operational, light cord in reach

## 2024-12-13 NOTE — OB PROVIDER DELIVERY SUMMARY - NSSELHIDDEN_OBGYN_ALL_OB_FT
[NS_DeliveryAttending1_OBGYN_ALL_OB_FT:[id number]==],[NS_DeliveryAssist1_OBGYN_ALL_OB_FT:[id number]]

## 2024-12-13 NOTE — OB RN PATIENT PROFILE - NS_OBGYNHISTORY_OBGYN_ALL_OB_FT
X 1NSVD 2023 - GDM  X1 Chemical 2022 pregnancy  Heart Murmur- No Meds X 1NSVD 2023 - GDM  X1 Chemical 2022 pregnancy  Heart Murmur- No Meds  Hypothyroid - Levothyroxine 150 mcg  PPD - No Meds  Psoriasis - Hydrocortisone

## 2024-12-13 NOTE — DISCHARGE NOTE OB - PLAN OF CARE
routine post partum care;  regular diet;  limited physical and sexual activities for 5-6 weeks;  to office in 2 weeks

## 2024-12-13 NOTE — PRE-ANESTHESIA EVALUATION ADULT - NSANTHAIRWAYFT_ENT_ALL_CORE
Mom leaves a message that Brain fell down 4-5 stairs yesterday and is complaining of arm pain.  Mom requests grandma be called to schedule appt and mom gives permission to treat.  Returned call to grandma per mom's request.  Grandma states he has been complaining of right arm pain since last night.  He is not using the arm.  It is tender and swollen.  He is moving his fingers and they are pink in color.  Advised the first available appt is 1850 this evening.  Grandma states she would prefer to see a different physician.  Advised grandma there are no alternative physicians available.  Grandma states she will go to the [place].  Offered website, but grandma states she will figure it out.    
Adequate mouth opening  Neck ROM intact

## 2024-12-14 LAB
BASOPHILS # BLD AUTO: 0.02 K/UL — SIGNIFICANT CHANGE UP (ref 0–0.2)
BASOPHILS NFR BLD AUTO: 0.1 % — SIGNIFICANT CHANGE UP (ref 0–2)
EOSINOPHIL # BLD AUTO: 0.02 K/UL — SIGNIFICANT CHANGE UP (ref 0–0.5)
EOSINOPHIL NFR BLD AUTO: 0.1 % — SIGNIFICANT CHANGE UP (ref 0–6)
HCT VFR BLD CALC: 35.3 % — SIGNIFICANT CHANGE UP (ref 34.5–45)
HCV RNA SPEC NAA+PROBE-LOG IU: SIGNIFICANT CHANGE UP
HCV RNA SPEC NAA+PROBE-LOG IU: SIGNIFICANT CHANGE UP LOGIU/ML
HGB BLD-MCNC: 11.7 G/DL — SIGNIFICANT CHANGE UP (ref 11.5–15.5)
IMM GRANULOCYTES NFR BLD AUTO: 1.1 % — HIGH (ref 0–0.9)
LYMPHOCYTES # BLD AUTO: 1.8 K/UL — SIGNIFICANT CHANGE UP (ref 1–3.3)
LYMPHOCYTES # BLD AUTO: 12.8 % — LOW (ref 13–44)
MCHC RBC-ENTMCNC: 29 PG — SIGNIFICANT CHANGE UP (ref 27–34)
MCHC RBC-ENTMCNC: 33.1 G/DL — SIGNIFICANT CHANGE UP (ref 32–36)
MCV RBC AUTO: 87.6 FL — SIGNIFICANT CHANGE UP (ref 80–100)
MONOCYTES # BLD AUTO: 0.88 K/UL — SIGNIFICANT CHANGE UP (ref 0–0.9)
MONOCYTES NFR BLD AUTO: 6.2 % — SIGNIFICANT CHANGE UP (ref 2–14)
NEUTROPHILS # BLD AUTO: 11.24 K/UL — HIGH (ref 1.8–7.4)
NEUTROPHILS NFR BLD AUTO: 79.7 % — HIGH (ref 43–77)
NRBC # BLD: 0 /100 WBCS — SIGNIFICANT CHANGE UP (ref 0–0)
PLATELET # BLD AUTO: 185 K/UL — SIGNIFICANT CHANGE UP (ref 150–400)
RBC # BLD: 4.03 M/UL — SIGNIFICANT CHANGE UP (ref 3.8–5.2)
RBC # FLD: 13.2 % — SIGNIFICANT CHANGE UP (ref 10.3–14.5)
WBC # BLD: 14.11 K/UL — HIGH (ref 3.8–10.5)
WBC # FLD AUTO: 14.11 K/UL — HIGH (ref 3.8–10.5)

## 2024-12-14 RX ORDER — LEVOTHYROXINE SODIUM 150 MCG
125 TABLET ORAL DAILY
Refills: 0 | Status: DISCONTINUED | OUTPATIENT
Start: 2024-12-14 | End: 2024-12-15

## 2024-12-14 RX ORDER — IBUPROFEN 200 MG
600 TABLET ORAL EVERY 6 HOURS
Refills: 0 | Status: DISCONTINUED | OUTPATIENT
Start: 2024-12-14 | End: 2024-12-15

## 2024-12-14 RX ADMIN — Medication 100 MILLIGRAM(S): at 08:23

## 2024-12-14 RX ADMIN — Medication 600 MILLIGRAM(S): at 23:26

## 2024-12-14 RX ADMIN — ACETAMINOPHEN 500MG 975 MILLIGRAM(S): 500 TABLET, COATED ORAL at 08:24

## 2024-12-14 RX ADMIN — Medication 5000 UNIT(S): at 18:12

## 2024-12-14 RX ADMIN — KETOROLAC TROMETHAMINE 30 MILLIGRAM(S): 30 INJECTION INTRAMUSCULAR; INTRAVENOUS at 00:40

## 2024-12-14 RX ADMIN — Medication 100 MILLIGRAM(S): at 00:15

## 2024-12-14 RX ADMIN — ACETAMINOPHEN 500MG 975 MILLIGRAM(S): 500 TABLET, COATED ORAL at 21:00

## 2024-12-14 RX ADMIN — ACETAMINOPHEN 500MG 975 MILLIGRAM(S): 500 TABLET, COATED ORAL at 14:44

## 2024-12-14 RX ADMIN — ACETAMINOPHEN 500MG 975 MILLIGRAM(S): 500 TABLET, COATED ORAL at 09:00

## 2024-12-14 RX ADMIN — Medication 5000 UNIT(S): at 06:35

## 2024-12-14 RX ADMIN — Medication 125 MILLILITER(S): at 12:22

## 2024-12-14 RX ADMIN — KETOROLAC TROMETHAMINE 30 MILLIGRAM(S): 30 INJECTION INTRAMUSCULAR; INTRAVENOUS at 12:16

## 2024-12-14 RX ADMIN — Medication 100 MILLIGRAM(S): at 16:58

## 2024-12-14 RX ADMIN — Medication 600 MILLIGRAM(S): at 18:40

## 2024-12-14 RX ADMIN — ACETAMINOPHEN 500MG 975 MILLIGRAM(S): 500 TABLET, COATED ORAL at 15:15

## 2024-12-14 RX ADMIN — Medication 600 MILLIGRAM(S): at 18:12

## 2024-12-14 RX ADMIN — Medication 125 MICROGRAM(S): at 07:36

## 2024-12-14 RX ADMIN — KETOROLAC TROMETHAMINE 30 MILLIGRAM(S): 30 INJECTION INTRAMUSCULAR; INTRAVENOUS at 06:34

## 2024-12-14 RX ADMIN — ACETAMINOPHEN 500MG 975 MILLIGRAM(S): 500 TABLET, COATED ORAL at 20:35

## 2024-12-14 RX ADMIN — KETOROLAC TROMETHAMINE 30 MILLIGRAM(S): 30 INJECTION INTRAMUSCULAR; INTRAVENOUS at 00:09

## 2024-12-14 RX ADMIN — KETOROLAC TROMETHAMINE 30 MILLIGRAM(S): 30 INJECTION INTRAMUSCULAR; INTRAVENOUS at 13:00

## 2024-12-14 RX ADMIN — ACETAMINOPHEN 500MG 975 MILLIGRAM(S): 500 TABLET, COATED ORAL at 03:13

## 2024-12-14 RX ADMIN — Medication 80 MILLIGRAM(S): at 20:35

## 2024-12-14 RX ADMIN — KETOROLAC TROMETHAMINE 30 MILLIGRAM(S): 30 INJECTION INTRAMUSCULAR; INTRAVENOUS at 07:15

## 2024-12-14 NOTE — PROGRESS NOTE ADULT - ASSESSMENT
A/P: 33yo POD #1 s/p pLTCS under GETA for terminal stephan.  Patient is stable and doing well post-operatively.      - Continue regular diet.  - Increase ambulation.  - PCA for pain control. Plan to transition to PO pain medication with Tylenol, Motrin and Oxycodone PRN for pain control.    - POD #1 CBC this morning.   - DVT prophylaxis with Heparin 5000u BID    Tracy Adams, PGY1   A/P: 33yo POD #1 s/p pLTCS under GETA for terminal stephan.  Patient is stable and doing well post-operatively.      - Continue regular diet.  - Increase ambulation.  - PCA for pain control. Plan to transition to PO pain medication with Tylenol, Motrin and Oxycodone PRN for pain control as per Anesthesia recommendations.     - POD #1 CBC this morning.   - DVT prophylaxis with Heparin 5000u BID    Tracy Adams, PGY1

## 2024-12-15 VITALS
HEART RATE: 84 BPM | TEMPERATURE: 98 F | OXYGEN SATURATION: 98 % | DIASTOLIC BLOOD PRESSURE: 64 MMHG | SYSTOLIC BLOOD PRESSURE: 101 MMHG | RESPIRATION RATE: 18 BRPM

## 2024-12-15 PROCEDURE — 86901 BLOOD TYPING SEROLOGIC RH(D): CPT

## 2024-12-15 PROCEDURE — 86780 TREPONEMA PALLIDUM: CPT

## 2024-12-15 PROCEDURE — 86900 BLOOD TYPING SEROLOGIC ABO: CPT

## 2024-12-15 PROCEDURE — 87522 HEPATITIS C REVRS TRNSCRPJ: CPT

## 2024-12-15 PROCEDURE — 85025 COMPLETE CBC W/AUTO DIFF WBC: CPT

## 2024-12-15 PROCEDURE — C1889: CPT

## 2024-12-15 PROCEDURE — 86850 RBC ANTIBODY SCREEN: CPT

## 2024-12-15 RX ADMIN — Medication 600 MILLIGRAM(S): at 12:24

## 2024-12-15 RX ADMIN — Medication 600 MILLIGRAM(S): at 11:54

## 2024-12-15 RX ADMIN — ACETAMINOPHEN 500MG 975 MILLIGRAM(S): 500 TABLET, COATED ORAL at 15:02

## 2024-12-15 RX ADMIN — Medication 600 MILLIGRAM(S): at 06:12

## 2024-12-15 RX ADMIN — Medication 600 MILLIGRAM(S): at 00:05

## 2024-12-15 RX ADMIN — ACETAMINOPHEN 500MG 975 MILLIGRAM(S): 500 TABLET, COATED ORAL at 15:32

## 2024-12-15 RX ADMIN — Medication 600 MILLIGRAM(S): at 06:42

## 2024-12-15 RX ADMIN — ACETAMINOPHEN 500MG 975 MILLIGRAM(S): 500 TABLET, COATED ORAL at 03:30

## 2024-12-15 RX ADMIN — Medication 125 MICROGRAM(S): at 06:34

## 2024-12-15 RX ADMIN — ACETAMINOPHEN 500MG 975 MILLIGRAM(S): 500 TABLET, COATED ORAL at 02:44

## 2024-12-15 RX ADMIN — ACETAMINOPHEN 500MG 975 MILLIGRAM(S): 500 TABLET, COATED ORAL at 09:24

## 2024-12-15 RX ADMIN — ACETAMINOPHEN 500MG 975 MILLIGRAM(S): 500 TABLET, COATED ORAL at 08:54

## 2024-12-15 RX ADMIN — Medication 5000 UNIT(S): at 06:12

## 2024-12-15 NOTE — PROGRESS NOTE ADULT - ATTENDING COMMENTS
Pt seen on am rounds and note reviewed    VSS AF  Fundus firm U-2  inc: C/D/I  ext: trace edema  A/P: POD 2 s/p primary  delivery  Routine care  d/c home-precautions reviewed  Matheus Pugh MD
Pt seen and note reviewed    VSS AF    Fundus firm U-2  inc: C/D/I  Ext: no cords  A/P: POD 1 s/p primary   -routine care  Matheus Pugh MD

## 2024-12-15 NOTE — PROGRESS NOTE ADULT - ASSESSMENT
A/P: 33yo POD #2 s/p pLTCS under GETA for terminal stephan.  Patient is stable and doing well post-operatively.      - Continue regular diet.  - Increase ambulation.  - PCA for pain control. Plan to transition to PO pain medication with Tylenol, Motrin and Oxycodone PRN for pain control as per Anesthesia recommendations.     - DVT prophylaxis with Heparin 5000u BID    Lottie Sierra PGY1

## 2024-12-15 NOTE — CHART NOTE - NSCHARTNOTEFT_GEN_A_CORE
I was called to examine the patient a few minutes after she was moved to the labor room.  She was still 7 cm, extremely uncomfortable, thrashing a bit.   difficult to do exam.   She wanted epi and I called for it as soon as I left the room.    At about 1422 I was called for a fhr deceleration.     This appeared to go down to the 60-70 range and stayed there.  We tried changing position, but were unable to get her to move.   I did an exam and she was fully dilated/-1 station.    I  ruptured membranes and put an internal fhr monitor on and it appeared to record.    she was not able to cooperate enough to get hernández in or try to push.     It was hard to see contractions on the monitor but her abdomen felt firm and I had the nurses give her terbutaline and transfer her to the c/s room.     I had multiple nurses and doctors helping to get her settled on the OR table.    The internal did not work.  On external, it sounded like fhr 80 and for about 20-30 seconds was up to 110 or so and then right back down.       I told the staff we had to deliver her emergently.   Anesthesia gave her GA, we immediately put the hernández in and started.   Viable male infant was delivered 8/9 apgars.  cord gases to lab and cord blood to lab.      Surgery went uneventfully.  anesthesia was with her the whole time in the labor room, OR and in transport to the OR.
Patient seen for: Nutrition consult for GDM postpartum education prior to discharge    Source: Patient and Medical Record    Patient is: ; GDM [A1] (during previous pregnancy)    Chart reviewed, events noted. Meds/Labs reviewed.    Confirms no known food allergy. No known recent N/V, constipation, or diarrhea. No BM since delivery.     Pt plans on breast feeding.     Anthropometrics:  Height: 61 inches  Pre-pregnancy weight: 165 pounds   Weight gain: 192.9 pounds   Admit/Dosing wt: 28 pounds     Nutrition Diagnosis:   Food and Nutrition Related Knowledge Deficit related to limited previous exposure to postpartum GDM nutrition education and recommendations as evidenced by GDM postpartum.    Goal: Pt to state at least two teach back points.    Intervention:  1. Post-partum GDM diet education provided: 1) Increased risk of developing T2DM with GDM and ways to help prevent development. 2) 4-12 week fasting oral glucose tolerance test follow-up as outpatient 3) General healthful diet and physical activity recommendations discussed 4) Pre-conception counseling/planning for future pregnancies and risks associated w/high glucose in the pre-conception period. 5) Increased energy needs with breastfeeding. After-delivery GDM education handout provided.   2. Handout: "What to expect now that you have had your baby"     Monitoring:  No other nutrition risks were identified during this encounter.    RD remains available upon request and will follow up per protocol.  Malini Carias RDN, CDN (Available via [employer])

## 2024-12-15 NOTE — PROGRESS NOTE ADULT - SUBJECTIVE AND OBJECTIVE BOX
OB Postpartum Note:  Delivery    S: 35yo now POD #1 s/p pLTCS under GETA for terminal bradycardia. Her pain is well controlled. She is tolerating a regular diet but has not yet passed flatus. Voiding spontaneously and ambulating without difficulty. Denies N/V. Denies CP/SOB/lightheadedness/dizziness.     O:   Vitals:  Vital Signs Last 24 Hrs  T(C): 36.8 (14 Dec 2024 01:00), Max: 36.9 (13 Dec 2024 18:00)  T(F): 98.2 (14 Dec 2024 01:00), Max: 98.4 (13 Dec 2024 18:00)  HR: 65 (14 Dec 2024 01:00) (61 - 117)  BP: 126/76 (14 Dec 2024 01:00) (110/57 - 141/82)  BP(mean): 82 (13 Dec 2024 19:30) (79 - 96)  RR: 18 (14 Dec 2024 01:00) (14 - 18)  SpO2: 99% (14 Dec 2024 01:00) (84% - 100%)    Parameters below as of 14 Dec 2024 01:00  Patient On (Oxygen Delivery Method): room air        MEDICATIONS  (STANDING):  acetaminophen     Tablet .. 975 milliGRAM(s) Oral <User Schedule>  ceFAZolin   IVPB 2000 milliGRAM(s) IV Intermittent every 8 hours  diphtheria/tetanus/pertussis (acellular) Vaccine (Adacel) 0.5 milliLiter(s) IntraMuscular once  heparin   Injectable 5000 Unit(s) SubCutaneous every 12 hours  HYDROmorphone PCA (1 mG/mL) 30 milliLiter(s) PCA Continuous PCA Continuous  ibuprofen  Tablet. 600 milliGRAM(s) Oral every 6 hours  ketorolac   Injectable 30 milliGRAM(s) IV Push every 6 hours  lactated ringers. 1000 milliLiter(s) (125 mL/Hr) IV Continuous <Continuous>  levothyroxine 125 MICROGram(s) Oral daily  ondansetron Injectable 4 milliGRAM(s) IV Push once  oxytocin Infusion 42 milliUNIT(s)/Min (42 mL/Hr) IV Continuous <Continuous>  oxytocin Infusion 167 milliUNIT(s)/Min (167 mL/Hr) IV Continuous <Continuous>    MEDICATIONS  (PRN):  diphenhydrAMINE 25 milliGRAM(s) Oral every 6 hours PRN Pruritus  HYDROmorphone PCA (1 mG/mL) Rescue Clinician Bolus 0.5 milliGRAM(s) IV Push every 10 minutes PRN Moderate Pain (4 - 6)  lanolin Ointment 1 Application(s) Topical every 6 hours PRN Sore Nipples  magnesium hydroxide Suspension 30 milliLiter(s) Oral two times a day PRN Constipation  naloxone Injectable 0.1 milliGRAM(s) IV Push every 3 minutes PRN For ANY of the following changes in patient status:  A. RR LESS THAN 10 breaths per minute, B. Oxygen saturation LESS THAN 90%, C. Sedation score of 6  ondansetron Injectable 4 milliGRAM(s) IV Push every 6 hours PRN Nausea  oxyCODONE    IR 5 milliGRAM(s) Oral every 3 hours PRN Moderate to Severe Pain (4-10)  oxyCODONE    IR 5 milliGRAM(s) Oral once PRN Moderate to Severe Pain (4-10)  simethicone 80 milliGRAM(s) Chew every 4 hours PRN Gas      Labs:  Blood type: O Positive  Rubella IgG: RPR: Negative                          13.1   11.04[H] >-----------< 198    ( 1213 @ 14:03 )             39.6        PE:  General: NAD, patient resting comfortably in bed  Abdomen: Mildly distended, appropriately tender  Incision: Clean, dry, intact.  Extremities: SCDs in place, no erythema, no edema    
OB Progress Note: LTCS, POD#2    S: 35yo POD#2 s/p LTCS. Pain is well controlled. She is tolerating a regular diet and passing flatus. She is voiding spontaneously, and ambulating without difficulty. Denies CP/SOB. Denies lightheadedness/dizziness. Denies N/V.    O:  Vitals:  Vital Signs Last 24 Hrs  T(C): 36.7 (14 Dec 2024 21:00), Max: 37.2 (14 Dec 2024 06:00)  T(F): 98.1 (14 Dec 2024 21:00), Max: 98.9 (14 Dec 2024 06:00)  HR: 80 (14 Dec 2024 21:00) (65 - 83)  BP: 97/59 (14 Dec 2024 21:00) (97/59 - 119/66)  BP(mean): --  RR: 18 (14 Dec 2024 21:00) (18 - 18)  SpO2: 96% (14 Dec 2024 21:00) (95% - 99%)    Parameters below as of 14 Dec 2024 21:00  Patient On (Oxygen Delivery Method): room air        MEDICATIONS  (STANDING):  acetaminophen     Tablet .. 975 milliGRAM(s) Oral <User Schedule>  diphtheria/tetanus/pertussis (acellular) Vaccine (Adacel) 0.5 milliLiter(s) IntraMuscular once  heparin   Injectable 5000 Unit(s) SubCutaneous every 12 hours  ibuprofen  Tablet. 600 milliGRAM(s) Oral every 6 hours  lactated ringers. 1000 milliLiter(s) (125 mL/Hr) IV Continuous <Continuous>  levothyroxine 125 MICROGram(s) Oral daily  ondansetron Injectable 4 milliGRAM(s) IV Push once  oxytocin Infusion 42 milliUNIT(s)/Min (42 mL/Hr) IV Continuous <Continuous>  oxytocin Infusion 167 milliUNIT(s)/Min (167 mL/Hr) IV Continuous <Continuous>      MEDICATIONS  (PRN):  diphenhydrAMINE 25 milliGRAM(s) Oral every 6 hours PRN Pruritus  lanolin Ointment 1 Application(s) Topical every 6 hours PRN Sore Nipples  magnesium hydroxide Suspension 30 milliLiter(s) Oral two times a day PRN Constipation  naloxone Injectable 0.1 milliGRAM(s) IV Push every 3 minutes PRN For ANY of the following changes in patient status:  A. RR LESS THAN 10 breaths per minute, B. Oxygen saturation LESS THAN 90%, C. Sedation score of 6  ondansetron Injectable 4 milliGRAM(s) IV Push every 6 hours PRN Nausea  oxyCODONE    IR 5 milliGRAM(s) Oral every 3 hours PRN Moderate to Severe Pain (4-10)  oxyCODONE    IR 5 milliGRAM(s) Oral once PRN Moderate to Severe Pain (4-10)  simethicone 80 milliGRAM(s) Chew every 4 hours PRN Gas      Labs:  Blood type: O Positive  Rubella IgG: RPR: Negative                          11.7   14.11[H] >-----------< 185    ( 12-14 @ 07:02 )             35.3                        13.1   11.04[H] >-----------< 198    ( 12-13 @ 14:03 )             39.6                  PE:  General: NAD  Abdomen: Soft, appropriately tender, incision c/d/i.  Extremities: No erythema, no pitting edema  
Day __1_ of Anesthesia Pain Management Service    SUBJECTIVE:    Pain Scale Score	At rest: ___ 	With Activity: ___ 	[ x] Refer to charted pain scores    THERAPY:    [ ] IV PCA Morphine		[ ] 5 mg/mL	[ ] 1 mg/mL  [ x] IV PCA Hydromorphone	[ ] 5 mg/mL	[x ] 1 mg/mL  [ ] IV PCA Fentanyl		[ ] 50 micrograms/mL    Demand dose: _0.2_    lockout:_6_  minutes  Continuous Rate:_0_       MEDICATIONS  (STANDING):  acetaminophen     Tablet .. 975 milliGRAM(s) Oral <User Schedule>  ceFAZolin   IVPB 2000 milliGRAM(s) IV Intermittent every 8 hours  diphtheria/tetanus/pertussis (acellular) Vaccine (Adacel) 0.5 milliLiter(s) IntraMuscular once  heparin   Injectable 5000 Unit(s) SubCutaneous every 12 hours  HYDROmorphone PCA (1 mG/mL) 30 milliLiter(s) PCA Continuous PCA Continuous  ibuprofen  Tablet. 600 milliGRAM(s) Oral every 6 hours  ketorolac   Injectable 30 milliGRAM(s) IV Push every 6 hours  lactated ringers. 1000 milliLiter(s) (125 mL/Hr) IV Continuous <Continuous>  levothyroxine 125 MICROGram(s) Oral daily  ondansetron Injectable 4 milliGRAM(s) IV Push once  oxytocin Infusion 42 milliUNIT(s)/Min (42 mL/Hr) IV Continuous <Continuous>  oxytocin Infusion 167 milliUNIT(s)/Min (167 mL/Hr) IV Continuous <Continuous>    MEDICATIONS  (PRN):  diphenhydrAMINE 25 milliGRAM(s) Oral every 6 hours PRN Pruritus  HYDROmorphone PCA (1 mG/mL) Rescue Clinician Bolus 0.5 milliGRAM(s) IV Push every 10 minutes PRN Moderate Pain (4 - 6)  lanolin Ointment 1 Application(s) Topical every 6 hours PRN Sore Nipples  magnesium hydroxide Suspension 30 milliLiter(s) Oral two times a day PRN Constipation  naloxone Injectable 0.1 milliGRAM(s) IV Push every 3 minutes PRN For ANY of the following changes in patient status:  A. RR LESS THAN 10 breaths per minute, B. Oxygen saturation LESS THAN 90%, C. Sedation score of 6  ondansetron Injectable 4 milliGRAM(s) IV Push every 6 hours PRN Nausea  oxyCODONE    IR 5 milliGRAM(s) Oral every 3 hours PRN Moderate to Severe Pain (4-10)  oxyCODONE    IR 5 milliGRAM(s) Oral once PRN Moderate to Severe Pain (4-10)  simethicone 80 milliGRAM(s) Chew every 4 hours PRN Gas      OBJECTIVE:    Sedation Score:	[x ] Alert	[ ] Drowsy 	[ ] Arousable	[ ] Asleep	[ ] Unresponsive    Side Effects:	[x ] None	[ ] Nausea	[ ] Vomiting	[ ] Pruritus  		[ ] Other:    Vital Signs Last 24 Hrs  T(C): 36.9 (14 Dec 2024 09:00), Max: 37.2 (14 Dec 2024 06:00)  T(F): 98.5 (14 Dec 2024 09:00), Max: 98.9 (14 Dec 2024 06:00)  HR: 72 (14 Dec 2024 09:00) (61 - 117)  BP: 115/72 (14 Dec 2024 09:00) (97/63 - 141/82)  BP(mean): 82 (13 Dec 2024 19:30) (79 - 96)  RR: 18 (14 Dec 2024 09:00) (14 - 18)  SpO2: 99% (14 Dec 2024 09:00) (84% - 100%)    Parameters below as of 14 Dec 2024 09:00  Patient On (Oxygen Delivery Method): room air        ASSESSMENT/ PLAN    Therapy to  be:	[x ] Continue   [ ] Discontinued   [ ] Change to prn Analgesics    Documentation and Verification of current medications:   [X] Done	[ ] Not done, not eligible

## 2025-01-31 DIAGNOSIS — E03.9 HYPOTHYROIDISM, UNSPECIFIED: ICD-10-CM

## 2025-02-02 ENCOUNTER — NON-APPOINTMENT (OUTPATIENT)
Age: 35
End: 2025-02-02

## 2025-03-20 DIAGNOSIS — E03.9 HYPOTHYROIDISM, UNSPECIFIED: ICD-10-CM

## 2025-04-21 NOTE — DISCHARGE NOTE OB - AVOID SEXUAL ACTIVITY UNTIL YOUR POSTPARTUM VISIT
Statement Selected
APPTS ARE READY TO BE MADE: [X] YES    Best Family or Patient Contact (if needed):  N/A  Additional Information about above appointments (if needed):  N/A  Other comments or requests:

## 2025-05-13 ENCOUNTER — NON-APPOINTMENT (OUTPATIENT)
Age: 35
End: 2025-05-13
